# Patient Record
Sex: FEMALE
[De-identification: names, ages, dates, MRNs, and addresses within clinical notes are randomized per-mention and may not be internally consistent; named-entity substitution may affect disease eponyms.]

---

## 2018-10-02 ENCOUNTER — RX RENEWAL (OUTPATIENT)
Age: 68
End: 2018-10-02

## 2018-10-03 ENCOUNTER — RX RENEWAL (OUTPATIENT)
Age: 68
End: 2018-10-03

## 2018-12-07 ENCOUNTER — RECORD ABSTRACTING (OUTPATIENT)
Age: 68
End: 2018-12-07

## 2018-12-07 DIAGNOSIS — Z78.9 OTHER SPECIFIED HEALTH STATUS: ICD-10-CM

## 2018-12-07 DIAGNOSIS — Z86.39 PERSONAL HISTORY OF OTHER ENDOCRINE, NUTRITIONAL AND METABOLIC DISEASE: ICD-10-CM

## 2018-12-07 DIAGNOSIS — I10 ESSENTIAL (PRIMARY) HYPERTENSION: ICD-10-CM

## 2018-12-07 DIAGNOSIS — Z82.49 FAMILY HISTORY OF ISCHEMIC HEART DISEASE AND OTHER DISEASES OF THE CIRCULATORY SYSTEM: ICD-10-CM

## 2018-12-07 DIAGNOSIS — Z83.3 FAMILY HISTORY OF DIABETES MELLITUS: ICD-10-CM

## 2018-12-07 DIAGNOSIS — Z87.39 PERSONAL HISTORY OF OTHER DISEASES OF THE MUSCULOSKELETAL SYSTEM AND CONNECTIVE TISSUE: ICD-10-CM

## 2018-12-11 ENCOUNTER — APPOINTMENT (OUTPATIENT)
Dept: ENDOCRINOLOGY | Facility: CLINIC | Age: 68
End: 2018-12-11
Payer: COMMERCIAL

## 2018-12-11 VITALS
WEIGHT: 262 LBS | BODY MASS INDEX: 48.21 KG/M2 | SYSTOLIC BLOOD PRESSURE: 148 MMHG | HEIGHT: 62 IN | HEART RATE: 70 BPM | DIASTOLIC BLOOD PRESSURE: 80 MMHG

## 2018-12-11 DIAGNOSIS — Z87.19 PERSONAL HISTORY OF OTHER DISEASES OF THE DIGESTIVE SYSTEM: ICD-10-CM

## 2018-12-11 DIAGNOSIS — Z87.09 PERSONAL HISTORY OF OTHER DISEASES OF THE RESPIRATORY SYSTEM: ICD-10-CM

## 2018-12-11 LAB — GLUCOSE BLDC GLUCOMTR-MCNC: 60

## 2018-12-11 PROCEDURE — 99214 OFFICE O/P EST MOD 30 MIN: CPT | Mod: 25

## 2018-12-11 PROCEDURE — 82962 GLUCOSE BLOOD TEST: CPT

## 2019-03-26 ENCOUNTER — MEDICATION RENEWAL (OUTPATIENT)
Age: 69
End: 2019-03-26

## 2019-03-27 ENCOUNTER — RX RENEWAL (OUTPATIENT)
Age: 69
End: 2019-03-27

## 2019-03-27 ENCOUNTER — MOBILE ON CALL (OUTPATIENT)
Age: 69
End: 2019-03-27

## 2019-03-29 ENCOUNTER — MEDICATION RENEWAL (OUTPATIENT)
Age: 69
End: 2019-03-29

## 2019-04-01 ENCOUNTER — RX RENEWAL (OUTPATIENT)
Age: 69
End: 2019-04-01

## 2019-04-19 ENCOUNTER — APPOINTMENT (OUTPATIENT)
Dept: ENDOCRINOLOGY | Facility: CLINIC | Age: 69
End: 2019-04-19
Payer: COMMERCIAL

## 2019-04-19 VITALS
BODY MASS INDEX: 47.84 KG/M2 | HEIGHT: 62 IN | WEIGHT: 260 LBS | HEART RATE: 70 BPM | DIASTOLIC BLOOD PRESSURE: 70 MMHG | SYSTOLIC BLOOD PRESSURE: 134 MMHG

## 2019-04-19 LAB
GLUCOSE BLDC GLUCOMTR-MCNC: 147
GLUCOSE SERPL-MCNC: 226
HBA1C MFR BLD HPLC: 7.3
LDLC SERPL DIRECT ASSAY-MCNC: 82

## 2019-04-19 PROCEDURE — 99214 OFFICE O/P EST MOD 30 MIN: CPT | Mod: 25

## 2019-04-19 PROCEDURE — 82962 GLUCOSE BLOOD TEST: CPT

## 2019-04-19 RX ORDER — INSULIN ASPART 100 [IU]/ML
100 INJECTION, SOLUTION INTRAVENOUS; SUBCUTANEOUS
Refills: 0 | Status: DISCONTINUED | COMMUNITY
End: 2019-04-19

## 2019-04-19 NOTE — DATA REVIEWED
[FreeTextEntry1] : Labs 12/10/2018:\par Glucose 88\par LDL  82\par A1c 7.2%\par Urine microalbumin/ Creatinine ratio  17

## 2019-04-19 NOTE — REVIEW OF SYSTEMS
[Cough] : cough [Recent Weight Gain (___ Lbs)] : no recent weight gain [Recent Weight Loss (___ Lbs)] : no recent weight loss [Chest Pain] : no chest pain [Shortness Of Breath] : no shortness of breath [Nausea] : no nausea [Polyuria] : no polyuria [Polydipsia] : no polydipsia

## 2019-04-19 NOTE — HISTORY OF PRESENT ILLNESS
[FreeTextEntry1] : Patient is seen today for a routine diabetic follow up.\par Quality:  pancreatic/ type 2\par Severity:  moderate\par Duration of diabetes:  since 2009\par Onset:  occurred after severe pancreatitis in 2009\par Associated Complications/ Symptoms:  hypoglycemia\par Modifying Factors:  Better with insulin\par \par Patient tests blood glucose 2 times per day.    Reports that AM fasting BG in the 140- 170 mg/dl range.  \par \par Current Diabetic Medication Regimen:\par Tresiba 75 units qAM\par Novolog 40/40/30 units\par \par Wore Thelma Pro in 5/2018 showing a high rate of hypoglycemia so basal insulin was reduced.  Not interested in home use CGM at this time.  \par \par For past 3 months has been dealing with a lot of right knee pain due to ACL tear and mobility is limited and sleep is disrupted due to pain.  \par

## 2019-04-19 NOTE — ASSESSMENT
[FreeTextEntry1] : 69 year old female with DM (type 2 versus element of pancreatic DM), HTN and hyperlipidemia.  her glycemic control is slightly above goal\par \par 1.  Type 2 DM-   increase Tresiba to 80 units daily.  \par 2.  Hyperlipidemia-  continue atorvastatin\par 3.  HTN-  continue ACE-I. \par 4.  Vitamin D insufficiency-  increase vitamin D dose to 4000 IU daily.

## 2019-04-19 NOTE — PHYSICAL EXAM
[No Acute Distress] : no acute distress [Normal Sclera/Conjunctiva] : normal sclera/conjunctiva [No Neck Mass] : no neck mass was observed [No Proptosis] : no proptosis [No LAD] : no lymphadenopathy [Thyroid Not Enlarged] : the thyroid was not enlarged [No Thyroid Nodules] : there were no palpable thyroid nodules [No Respiratory Distress] : no respiratory distress [Clear to Auscultation] : lungs were clear to auscultation bilaterally [Normal Rate] : heart rate was normal  [Normal S1, S2] : normal S1 and S2 [Regular Rhythm] : with a regular rhythm [Acanthosis Nigricans] : acanthosis nigricans [Murmurs] : no murmurs [Normal Insight/Judgement] : insight and judgment were intact [Normal Affect] : the affect was normal [Normal Mood] : the mood was normal [de-identified] : Obese female [de-identified] : Trace b/l LE edema (R>L)

## 2019-06-14 ENCOUNTER — MEDICATION RENEWAL (OUTPATIENT)
Age: 69
End: 2019-06-14

## 2019-08-15 ENCOUNTER — MEDICATION RENEWAL (OUTPATIENT)
Age: 69
End: 2019-08-15

## 2019-08-30 ENCOUNTER — APPOINTMENT (OUTPATIENT)
Dept: ENDOCRINOLOGY | Facility: CLINIC | Age: 69
End: 2019-08-30
Payer: COMMERCIAL

## 2019-08-30 VITALS
WEIGHT: 269 LBS | SYSTOLIC BLOOD PRESSURE: 130 MMHG | HEART RATE: 75 BPM | BODY MASS INDEX: 49.5 KG/M2 | HEIGHT: 62 IN | DIASTOLIC BLOOD PRESSURE: 80 MMHG

## 2019-08-30 LAB
GLUCOSE BLDC GLUCOMTR-MCNC: 152
HBA1C MFR BLD HPLC: 7.1
LDLC SERPL DIRECT ASSAY-MCNC: 90

## 2019-08-30 PROCEDURE — 82962 GLUCOSE BLOOD TEST: CPT

## 2019-08-30 PROCEDURE — 99214 OFFICE O/P EST MOD 30 MIN: CPT | Mod: 25

## 2019-08-30 RX ORDER — CELECOXIB 50 MG/1
CAPSULE ORAL
Refills: 0 | Status: DISCONTINUED | COMMUNITY
End: 2019-08-30

## 2019-08-30 NOTE — HISTORY OF PRESENT ILLNESS
[FreeTextEntry1] : Patient is seen today for a routine diabetic follow up.\par Quality:  pancreatic/ type 2 DM\par Severity:  moderate\par Duration of diabetes:  since 2009\par Onset:  occurred after severe pancreatitis in 2009\par Associated Complications/ Symptoms:  hypoglycemia\par Modifying Factors:  Better with insulin\par \par Patient tests blood glucose 2 times per day.     Reports that most BG under 150 mg/dl at home.   Hypoglycemia is rare.  \par \par Current Diabetic Medication Regimen:\par Tresiba 80 units qAM\par Novolog 40/35/35 units\par \par Wore Thelma Pro in 5/2018 showing a high rate of hypoglycemia so basal insulin was reduced.  Not interested in home use CGM at this time.  \par \par Having a right total knee replacement in September.  C/o diffuse joint pain.  \par  \par

## 2019-08-30 NOTE — PHYSICAL EXAM
[No Acute Distress] : no acute distress [Normal Sclera/Conjunctiva] : normal sclera/conjunctiva [No Proptosis] : no proptosis [No Neck Mass] : no neck mass was observed [No LAD] : no lymphadenopathy [No Thyroid Nodules] : there were no palpable thyroid nodules [Thyroid Not Enlarged] : the thyroid was not enlarged [No Respiratory Distress] : no respiratory distress [Clear to Auscultation] : lungs were clear to auscultation bilaterally [Normal S1, S2] : normal S1 and S2 [Normal Rate] : heart rate was normal  [Regular Rhythm] : with a regular rhythm [Murmurs] : no murmurs [Acanthosis Nigricans] : acanthosis nigricans [Normal Insight/Judgement] : insight and judgment were intact [Normal Affect] : the affect was normal [Normal Mood] : the mood was normal [de-identified] : Obese female

## 2019-08-30 NOTE — REVIEW OF SYSTEMS
[Recent Weight Gain (___ Lbs)] : recent [unfilled] ~Ulb weight gain [Joint Pain] : joint pain [Chest Pain] : no chest pain [Shortness Of Breath] : no shortness of breath [Nausea] : no nausea [Abdominal Pain] : no abdominal pain [Pain/Numbness of Digits] : no pain/numbness of digits

## 2019-08-30 NOTE — DATA REVIEWED
[FreeTextEntry1] : Labs \par 8/13/2019\par LDL  90\par 25(OH)D  29\par A1c  7.1%\par TSH  2\par \par 12/10/2018:\par Glucose 88\par LDL  82\par A1c 7.2%\par Urine microalbumin/ Creatinine ratio  17

## 2019-08-30 NOTE — ASSESSMENT
[FreeTextEntry1] : 69 year old female with DM (type 2 versus element of pancreatic DM), HTN and hyperlipidemia.  her glycemic control is improving.    She can safely proceed with knee replacement from diabetic perspective.  \par \par 1.  Type 2 DM-   Continue current insulin doses.  Work on diet and exercise.   On the morning of surgery, I have instructed patient to reduce Tresiba insulin dose to 50 units to reduce risk of hypoglycemia.    \par 2.  Hyperlipidemia-  continue atorvastatin\par 3.  HTN-  continue ACE-I. \par 4.  Vitamin D insufficiency-   Continue vitamin D supplement (has not been taking lately).

## 2019-08-30 NOTE — CONSULT LETTER
[Dear  ___] : Dear  [unfilled], [Courtesy Letter:] : I had the pleasure of seeing your patient, [unfilled], in my office today. [Please see my note below.] : Please see my note below. [Consult Closing:] : Thank you very much for allowing me to participate in the care of this patient.  If you have any questions, please do not hesitate to contact me. [Sincerely,] : Sincerely, [DrJigar  ___] : Dr. BENÍTEZ [FreeTextEntry3] : Myron Hussein MD, FACE\par

## 2020-02-07 ENCOUNTER — RX RENEWAL (OUTPATIENT)
Age: 70
End: 2020-02-07

## 2020-03-17 ENCOUNTER — RX RENEWAL (OUTPATIENT)
Age: 70
End: 2020-03-17

## 2020-04-22 ENCOUNTER — RX RENEWAL (OUTPATIENT)
Age: 70
End: 2020-04-22

## 2020-06-10 LAB
HBA1C MFR BLD HPLC: 7.3
LDLC SERPL DIRECT ASSAY-MCNC: 90
MICROALBUMIN/CREAT 24H UR-RTO: 22

## 2020-06-11 ENCOUNTER — RESULT CHARGE (OUTPATIENT)
Age: 70
End: 2020-06-11

## 2020-06-11 ENCOUNTER — APPOINTMENT (OUTPATIENT)
Dept: ENDOCRINOLOGY | Facility: CLINIC | Age: 70
End: 2020-06-11
Payer: COMMERCIAL

## 2020-06-11 VITALS
BODY MASS INDEX: 49.69 KG/M2 | HEART RATE: 73 BPM | HEIGHT: 62 IN | DIASTOLIC BLOOD PRESSURE: 76 MMHG | SYSTOLIC BLOOD PRESSURE: 136 MMHG | WEIGHT: 270 LBS

## 2020-06-11 LAB — GLUCOSE BLDC GLUCOMTR-MCNC: 115

## 2020-06-11 PROCEDURE — 82962 GLUCOSE BLOOD TEST: CPT

## 2020-06-11 PROCEDURE — 99214 OFFICE O/P EST MOD 30 MIN: CPT | Mod: 25

## 2020-06-11 NOTE — ASSESSMENT
[FreeTextEntry1] : 70 year old female with DM (type 2 versus element of pancreatic DM), HTN and hyperlipidemia.   Her glycemic control is reasonable.  \par \par 1. Type 2 DM- Continue current insulin doses.  \par 2. Hyperlipidemia- continue atorvastatin\par 3. HTN- continue ACE-I. \par 4. Vitamin D insufficiency-  Insufficient on OTC dose.  Change to Vitamin D2 50,000 IU every 2 weeks.

## 2020-06-11 NOTE — PHYSICAL EXAM
[Obese] : obese [No Acute Distress] : no acute distress [Normal Sclera/Conjunctiva] : normal sclera/conjunctiva [No Neck Mass] : no neck mass was observed [No Proptosis] : no proptosis [No LAD] : no lymphadenopathy [Thyroid Not Enlarged] : the thyroid was not enlarged [Supple] : the neck was supple [No Thyroid Nodules] : no palpable thyroid nodules [Clear to Auscultation] : lungs were clear to auscultation bilaterally [No Respiratory Distress] : no respiratory distress [No Murmurs] : no murmurs [Normal S1, S2] : normal S1 and S2 [Normal Rate] : heart rate was normal [Regular Rhythm] : with a regular rhythm [Normal Insight/Judgement] : insight and judgment were intact [Normal Affect] : the affect was normal [Normal Mood] : the mood was normal [Acanthosis Nigricans] : no acanthosis nigricans

## 2020-06-11 NOTE — REVIEW OF SYSTEMS
[Shortness Of Breath] : shortness of breath [Recent Weight Gain (___ Lbs)] : no recent weight gain [Recent Weight Loss (___ Lbs)] : no recent weight loss [Nausea] : no nausea [Chest Pain] : no chest pain [Pain/Numbness of Digits] : no pain/numbness of digits

## 2020-06-11 NOTE — HISTORY OF PRESENT ILLNESS
[FreeTextEntry1] : Follow up DM, Hyperlipidemia and HTN.  \par Quality:  pancreatic/ type 2 DM\par Severity:  moderate\par Duration of diabetes:  since 2009\par Onset:  occurred after severe pancreatitis in 2009\par Associated Complications/ Symptoms:  hypoglycemia\par Modifying Factors:  Better with insulin\par \par Patient tests blood glucose 2 times per day.     AM fasting BG usually around 150 mg/dl.  Hypoglycemia is rare.  \par \par Current Diabetic Medication Regimen:\par Tresiba 80 units qAM\par Novolog 40/35/35 units\par \par Intolerant to metformin, cannot take GLP-1 due to history of pancreatitis.  \par Wore Thelma Pro in 5/201.  Not interested in home use CGM at this time.  \par  \par Will need cataract surgery this summer. \par

## 2020-10-07 ENCOUNTER — RX RENEWAL (OUTPATIENT)
Age: 70
End: 2020-10-07

## 2020-11-05 ENCOUNTER — NON-APPOINTMENT (OUTPATIENT)
Age: 70
End: 2020-11-05

## 2020-11-05 RX ORDER — PNV NO.95/FERROUS FUM/FOLIC AC 28MG-0.8MG
500-125 TABLET ORAL DAILY
Refills: 0 | Status: ACTIVE | COMMUNITY

## 2020-11-05 RX ORDER — B-COMPLEX WITH VITAMIN C
TABLET ORAL DAILY
Refills: 0 | Status: ACTIVE | COMMUNITY

## 2020-12-21 ENCOUNTER — RX RENEWAL (OUTPATIENT)
Age: 70
End: 2020-12-21

## 2020-12-29 ENCOUNTER — APPOINTMENT (OUTPATIENT)
Dept: ENDOCRINOLOGY | Facility: CLINIC | Age: 70
End: 2020-12-29

## 2021-01-06 ENCOUNTER — APPOINTMENT (OUTPATIENT)
Dept: PULMONOLOGY | Facility: CLINIC | Age: 71
End: 2021-01-06

## 2021-01-11 ENCOUNTER — APPOINTMENT (OUTPATIENT)
Dept: PULMONOLOGY | Facility: CLINIC | Age: 71
End: 2021-01-11
Payer: COMMERCIAL

## 2021-01-11 VITALS
DIASTOLIC BLOOD PRESSURE: 90 MMHG | HEART RATE: 58 BPM | SYSTOLIC BLOOD PRESSURE: 138 MMHG | TEMPERATURE: 97.3 F | OXYGEN SATURATION: 97 %

## 2021-01-11 DIAGNOSIS — Z87.39 PERSONAL HISTORY OF OTHER DISEASES OF THE MUSCULOSKELETAL SYSTEM AND CONNECTIVE TISSUE: ICD-10-CM

## 2021-01-11 PROCEDURE — 99214 OFFICE O/P EST MOD 30 MIN: CPT | Mod: 25

## 2021-01-11 PROCEDURE — 94727 GAS DIL/WSHOT DETER LNG VOL: CPT

## 2021-01-11 PROCEDURE — 94060 EVALUATION OF WHEEZING: CPT

## 2021-01-11 PROCEDURE — 94729 DIFFUSING CAPACITY: CPT

## 2021-01-11 PROCEDURE — 99072 ADDL SUPL MATRL&STAF TM PHE: CPT

## 2021-01-11 NOTE — HISTORY OF PRESENT ILLNESS
[Never] : never [TextBox_4] : the patient is 70 year F with an abnormal Ct scan c/w early ILD  She has had a stable Ct scan and a stable PFT  She ios s/p TKR- R knee and then recently cataract surgery  She is still on opthalmic steroids  She has RA and had taken methotrexate in the past

## 2021-01-11 NOTE — REVIEW OF SYSTEMS
[SOB on Exertion] : sob on exertion [Diabetes] : diabetes [Obesity] : obesity [Negative] : Psychiatric [Seasonal Allergies] : no seasonal allergies [Thyroid Problem] : no thyroid problem [TextBox_14] : sx post cataract surgery  [TextBox_94] : KENDRA membreno now has back issues

## 2021-01-11 NOTE — ASSESSMENT
[FreeTextEntry1] : The patient is doing well  but is limited by the RA and obesity  I told her at age 71 the extra weight will further exaggerate her functional status  She has stable ILD and she had a PFT which was stable  She had a stable Ct scan in 9/21 She will need a PFt in 6mos  and a Ct in one year

## 2021-01-11 NOTE — REASON FOR VISIT
[Follow-Up] : a follow-up visit [ILD] : ILD [TextBox_44] : PT IS A 71 YO FEMALE WITH HX OF INTERSTITIAL LUNG DX.  PT STATES THAT SHE STILL HAS A COUGH, NON-PRODUCTIVE, BUT DENIES ANY WHEEZING.  PT HAS OCCASIONAL SOB DEPENDING ON ACTIVITY.PT DENIES ANY FEVER, CHILLS, CHEST PAIN OR TIGHTNESS WHEN BREATHING. PT HAD PFT TODAY

## 2021-01-11 NOTE — PHYSICAL EXAM
[No Acute Distress] : no acute distress [Normal Appearance] : normal appearance [Normal Rate/Rhythm] : normal rate/rhythm [Normal S1, S2] : normal s1, s2 [No Resp Distress] : no resp distress [No Abnormalities] : no abnormalities [Benign] : benign [No Clubbing] : no clubbing [No Cyanosis] : no cyanosis [No Edema] : no edema [FROM] : FROM [No Focal Deficits] : no focal deficits [Oriented x3] : oriented x3 [Normal Affect] : normal affect [TextBox_68] : crackles bilaterally lower post bases

## 2021-01-21 ENCOUNTER — APPOINTMENT (OUTPATIENT)
Dept: ENDOCRINOLOGY | Facility: CLINIC | Age: 71
End: 2021-01-21

## 2021-02-05 ENCOUNTER — APPOINTMENT (OUTPATIENT)
Dept: ENDOCRINOLOGY | Facility: CLINIC | Age: 71
End: 2021-02-05
Payer: COMMERCIAL

## 2021-02-05 VITALS
DIASTOLIC BLOOD PRESSURE: 84 MMHG | SYSTOLIC BLOOD PRESSURE: 124 MMHG | HEIGHT: 62 IN | TEMPERATURE: 98.1 F | WEIGHT: 268.5 LBS | RESPIRATION RATE: 16 BRPM | BODY MASS INDEX: 49.41 KG/M2 | HEART RATE: 74 BPM | OXYGEN SATURATION: 96 %

## 2021-02-05 LAB — GLUCOSE BLDC GLUCOMTR-MCNC: 95

## 2021-02-05 PROCEDURE — 82962 GLUCOSE BLOOD TEST: CPT

## 2021-02-05 PROCEDURE — 99072 ADDL SUPL MATRL&STAF TM PHE: CPT

## 2021-02-05 PROCEDURE — 99214 OFFICE O/P EST MOD 30 MIN: CPT | Mod: 25

## 2021-02-05 RX ORDER — ADHESIVE TAPE 3"X 2.3 YD
50 MCG TAPE, NON-MEDICATED TOPICAL
Refills: 0 | Status: DISCONTINUED | COMMUNITY
End: 2021-02-05

## 2021-02-05 NOTE — ASSESSMENT
[FreeTextEntry1] : 71 year old female with DM (type 2 versus element of pancreatic DM), HTN and hyperlipidemia.    Her diabetes control seems reasonable, but need labs to confirm.   \par \par 1.  Type 2 DM-  check labs now.  Try addition of Jardiance 10 mg daily.  May need to reduce mealtime Novolog doses by 5 units if BG is trending lower with SGLT-2 Inhibitor.   \par 2. Hyperlipidemia- continue atorvastatin\par 3. HTN- continue ACE-I. \par 4. Vitamin D insufficiency-  Continue Vitamin D2 50,000 IU every 2 weeks.

## 2021-02-05 NOTE — DATA REVIEWED
[FreeTextEntry1] : Labs \par 10/2/2020:\par Creatinine 0.98\par LDL 98\par A1c 7.4%\par 25(OH)D  34\par \par 8/13/2019\par LDL  90\par 25(OH)D  29\par A1c  7.1%\par TSH  2\par \par 12/10/2018:\par Glucose 88\par LDL  82\par A1c 7.2%\par Urine microalbumin/ Creatinine ratio  17

## 2021-02-05 NOTE — HISTORY OF PRESENT ILLNESS
[FreeTextEntry1] : Follow up DM, Hyperlipidemia and HTN.\par   \par Quality:  pancreatic/ type 2 DM\par Severity:  moderate\par Duration of diabetes:  since 2009\par Onset:  occurred after severe pancreatitis in 2009\par Associated Complications/ Symptoms:  hypoglycemia\par Modifying Factors:  Better with insulin\par \par Patient tests blood glucose 2 times per day.    Reports that hypoglycemia is rare.   \par \par Current Diabetic Medication Regimen:\par Tresiba 85 units qAM\par Novolog 40/35/35 units\par \par Intolerant to metformin, cannot take GLP-1 due to history of pancreatitis.  \par Wore Thelma Pro in 5/201.  Not interested in home use CGM at this time.  \par  \par \par

## 2021-02-05 NOTE — PHYSICAL EXAM
[Obese] : obese [No Acute Distress] : no acute distress [Normal Sclera/Conjunctiva] : normal sclera/conjunctiva [No Lid Lag] : no lid lag [No Respiratory Distress] : no respiratory distress [Clear to Auscultation] : lungs were clear to auscultation bilaterally [Normal S1, S2] : normal S1 and S2 [No Murmurs] : no murmurs [Normal Rate] : heart rate was normal [Regular Rhythm] : with a regular rhythm [Right Foot Was Examined] : right foot ~C was examined [Left Foot Was Examined] : left foot ~C was examined [Normal] : normal [1+] : 1+ in the dorsalis pedis [Normal Affect] : the affect was normal [Normal Insight/Judgement] : insight and judgment were intact [Normal Mood] : the mood was normal [Acanthosis Nigricans] : no acanthosis nigricans [Diminished Throughout Both Feet] : normal tactile sensation with monofilament testing throughout both feet [FreeTextEntry1] : Callus [FreeTextEntry5] : Callus

## 2021-03-04 VITALS — WEIGHT: 261 LBS | BODY MASS INDEX: 46.25 KG/M2 | HEIGHT: 63 IN

## 2021-05-05 DIAGNOSIS — Z87.39 PERSONAL HISTORY OF OTHER DISEASES OF THE MUSCULOSKELETAL SYSTEM AND CONNECTIVE TISSUE: ICD-10-CM

## 2021-05-24 ENCOUNTER — RX RENEWAL (OUTPATIENT)
Age: 71
End: 2021-05-24

## 2021-06-02 ENCOUNTER — NON-APPOINTMENT (OUTPATIENT)
Age: 71
End: 2021-06-02

## 2021-06-05 ENCOUNTER — APPOINTMENT (OUTPATIENT)
Dept: FAMILY MEDICINE | Facility: CLINIC | Age: 71
End: 2021-06-05

## 2021-06-22 LAB
HBA1C MFR BLD HPLC: 7.2
LDLC SERPL DIRECT ASSAY-MCNC: 95
MICROALBUMIN/CREAT 24H UR-RTO: 67

## 2021-06-25 ENCOUNTER — APPOINTMENT (OUTPATIENT)
Dept: ENDOCRINOLOGY | Facility: CLINIC | Age: 71
End: 2021-06-25
Payer: MEDICARE

## 2021-06-25 VITALS
OXYGEN SATURATION: 97 % | BODY MASS INDEX: 47.59 KG/M2 | HEART RATE: 96 BPM | RESPIRATION RATE: 16 BRPM | SYSTOLIC BLOOD PRESSURE: 140 MMHG | WEIGHT: 268.56 LBS | HEIGHT: 63 IN | DIASTOLIC BLOOD PRESSURE: 80 MMHG | TEMPERATURE: 98.3 F

## 2021-06-25 LAB — GLUCOSE BLDC GLUCOMTR-MCNC: 263

## 2021-06-25 PROCEDURE — 82962 GLUCOSE BLOOD TEST: CPT

## 2021-06-25 PROCEDURE — 99214 OFFICE O/P EST MOD 30 MIN: CPT

## 2021-06-25 RX ORDER — EMPAGLIFLOZIN 10 MG/1
10 TABLET, FILM COATED ORAL DAILY
Qty: 30 | Refills: 5 | Status: DISCONTINUED | COMMUNITY
Start: 2021-02-05 | End: 2021-06-25

## 2021-06-25 NOTE — PHYSICAL EXAM
[Obese] : obese [No Acute Distress] : no acute distress [Normal Sclera/Conjunctiva] : normal sclera/conjunctiva [No Lid Lag] : no lid lag [No Neck Mass] : no neck mass was observed [No LAD] : no lymphadenopathy [Supple] : the neck was supple [Thyroid Not Enlarged] : the thyroid was not enlarged [No Thyroid Nodules] : no palpable thyroid nodules [No Respiratory Distress] : no respiratory distress [Clear to Auscultation] : lungs were clear to auscultation bilaterally [Normal S1, S2] : normal S1 and S2 [No Murmurs] : no murmurs [Normal Rate] : heart rate was normal [Regular Rhythm] : with a regular rhythm [Normal Affect] : the affect was normal [Normal Insight/Judgement] : insight and judgment were intact [Normal Mood] : the mood was normal [Acanthosis Nigricans] : no acanthosis nigricans

## 2021-06-25 NOTE — HISTORY OF PRESENT ILLNESS
[FreeTextEntry1] : Follow up DM, Hyperlipidemia and HTN.\par   \par Quality:  pancreatic/ type 2 DM\par Severity:  moderate\par Duration of diabetes:  since 2009\par Onset:  occurred after severe pancreatitis in 2009\par Associated Complications/ Symptoms:  microalbuminuria\par Modifying Factors:  Better with insulin\par \par Patient tests blood glucose 2 times per day.    Did not bring glucometer to visit. \par \par Current Diabetic Medication Regimen:\par Tresiba 85 units qAM\par Novolog 45/40/40 units\par Last visit given Rx for Jardiance, but stopped taking due to recurrent yeast infections.  \par \par Intolerant to metformin, cannot take GLP-1 due to history of pancreatitis.  \par Wore Thelma Pro in 5/201.  Not interested in home use CGM at this time.  \par  \par \par

## 2021-06-25 NOTE — DATA REVIEWED
[FreeTextEntry1] : Labs \par 10/2/2020:\par Creatinine 0.98\par LDL 98\par A1c 7.4%\par 25(OH)D  34\par \par \par \par

## 2021-06-25 NOTE — REVIEW OF SYSTEMS
[Back Pain] : back pain [Recent Weight Gain (___ Lbs)] : no recent weight gain [Recent Weight Loss (___ Lbs)] : no recent weight loss [Chest Pain] : no chest pain [Shortness Of Breath] : no shortness of breath

## 2021-06-25 NOTE — ASSESSMENT
[FreeTextEntry1] : 71 year old female with DM (type 2 versus element of pancreatic DM), HTN and hyperlipidemia.    Her diabetes control is reasonable.  She now has slight microalbuminuria.  \par \par 1.  Type 2 DM-  Continue current insulin regimen.  Repeat A1c in 3 months.    Discussed that she will be covered for Dexcom on Medicare, but patient is unsure if she wants to retry CGM.  \par 2. Hyperlipidemia- continue atorvastatin\par 3. HTN- continue lisinopril\par 4. Vitamin D insufficiency-  Continue Vitamin D2 50,000 IU every 2 weeks.

## 2021-07-07 ENCOUNTER — APPOINTMENT (OUTPATIENT)
Dept: FAMILY MEDICINE | Facility: CLINIC | Age: 71
End: 2021-07-07
Payer: MEDICARE

## 2021-07-07 VITALS
BODY MASS INDEX: 47.84 KG/M2 | TEMPERATURE: 96.8 F | HEART RATE: 66 BPM | DIASTOLIC BLOOD PRESSURE: 88 MMHG | OXYGEN SATURATION: 98 % | SYSTOLIC BLOOD PRESSURE: 134 MMHG | HEIGHT: 63 IN | WEIGHT: 270 LBS

## 2021-07-07 PROCEDURE — 99213 OFFICE O/P EST LOW 20 MIN: CPT

## 2021-07-07 RX ORDER — ATORVASTATIN CALCIUM 20 MG/1
20 TABLET, FILM COATED ORAL
Qty: 90 | Refills: 0 | Status: DISCONTINUED | COMMUNITY
Start: 2018-09-07 | End: 2021-07-07

## 2021-07-07 NOTE — HISTORY OF PRESENT ILLNESS
[FreeTextEntry1] : feeeling well\par \par presents for  refills\par \par will be having eye surgery july 20\par \par history of abnormal ekg\par will need  cardiac clearance

## 2021-07-07 NOTE — REVIEW OF SYSTEMS
[Chest Pain] : no chest pain [Shortness Of Breath] : no shortness of breath [FreeTextEntry2] : non contributory  except as stated in  cc

## 2021-07-07 NOTE — PLAN
[FreeTextEntry1] : will need  cardiac  clearance\par \par follows with rhem.  cardiology and pulmonary\par \par \par rto  for  pre op clearance  3-5 days prior to surgery\par \par will be having pre op testing at hospital\par \par \par \par

## 2021-07-19 ENCOUNTER — APPOINTMENT (OUTPATIENT)
Dept: FAMILY MEDICINE | Facility: CLINIC | Age: 71
End: 2021-07-19
Payer: MEDICARE

## 2021-07-19 VITALS
DIASTOLIC BLOOD PRESSURE: 84 MMHG | TEMPERATURE: 97.1 F | RESPIRATION RATE: 12 BRPM | WEIGHT: 270 LBS | OXYGEN SATURATION: 95 % | SYSTOLIC BLOOD PRESSURE: 136 MMHG | HEIGHT: 63 IN | HEART RATE: 68 BPM | BODY MASS INDEX: 47.84 KG/M2

## 2021-07-19 DIAGNOSIS — T75.3XXA MOTION SICKNESS, INITIAL ENCOUNTER: ICD-10-CM

## 2021-07-19 PROCEDURE — 99214 OFFICE O/P EST MOD 30 MIN: CPT

## 2021-07-19 RX ORDER — SCOPOLAMINE 1.5 MG/1
1 PATCH, EXTENDED RELEASE TRANSDERMAL
Qty: 1 | Refills: 1 | Status: ACTIVE | COMMUNITY
Start: 2021-07-19 | End: 1900-01-01

## 2021-07-19 NOTE — PLAN
[FreeTextEntry1] : WILL CLEAR AFTER  REVIEW OF LABS  AND CHEST X RAY  \par \par HAS  CARDIAC CLEARANCE\par \par

## 2021-07-19 NOTE — HISTORY OF PRESENT ILLNESS
[FreeTextEntry1] : FEELING WELL\par \par PRESENTS FOR  PRE OP CLEARANCE\par \par HAS  CARDIAC CLEARANCE

## 2021-07-20 ENCOUNTER — APPOINTMENT (OUTPATIENT)
Dept: PULMONOLOGY | Facility: CLINIC | Age: 71
End: 2021-07-20

## 2021-09-24 ENCOUNTER — APPOINTMENT (OUTPATIENT)
Dept: PULMONOLOGY | Facility: CLINIC | Age: 71
End: 2021-09-24
Payer: MEDICARE

## 2021-09-24 VITALS
HEIGHT: 63 IN | HEART RATE: 77 BPM | TEMPERATURE: 98.4 F | DIASTOLIC BLOOD PRESSURE: 85 MMHG | OXYGEN SATURATION: 94 % | BODY MASS INDEX: 46.07 KG/M2 | SYSTOLIC BLOOD PRESSURE: 173 MMHG | WEIGHT: 260 LBS

## 2021-09-24 PROCEDURE — 99214 OFFICE O/P EST MOD 30 MIN: CPT

## 2021-09-24 RX ORDER — PREDNISOLONE ACETATE 10 MG/ML
1 SUSPENSION/ DROPS OPHTHALMIC
Qty: 15 | Refills: 0 | Status: ACTIVE | COMMUNITY
Start: 2021-09-21

## 2021-09-24 NOTE — REASON FOR VISIT
Detail Level: Zone [Follow-Up] : a follow-up visit [TextBox_44] : Pt currently denies any cough or wheeze, but admits to a little SOB if she moves around too much.  Pt had PFT today and CT scan in June - copt scanned to chart

## 2021-09-24 NOTE — HISTORY OF PRESENT ILLNESS
[Never] : never [TextBox_4] : the patient is 70 year F with an abnormal Ct scan c/w early ILD  She has had a stable Ct scan and a stable PFT  She ios s/p TKR- R knee and then recently cataract surgery  She is still on opthalmic steroids  She has RA and had taken methotrexate in the past \par \par 9/24/21 The patient is a 70 yo with Rheum A and she  has mild ILD She is off methotrexate and is on orencia and NSAIDS   She had a PFT today

## 2021-09-24 NOTE — PHYSICAL EXAM
[No Acute Distress] : no acute distress [Normal Appearance] : normal appearance [Normal Rate/Rhythm] : normal rate/rhythm [Normal S1, S2] : normal s1, s2 [No Resp Distress] : no resp distress [No Abnormalities] : no abnormalities [Benign] : benign [No Clubbing] : no clubbing [No Cyanosis] : no cyanosis [No Edema] : no edema [FROM] : FROM [No Focal Deficits] : no focal deficits [Oriented x3] : oriented x3 [Normal Affect] : normal affect [TextBox_2] : morbidly obese  [TextBox_68] : crackles bilaterally lower post bases

## 2021-09-24 NOTE — ASSESSMENT
[FreeTextEntry1] : The patient is doing well  but is limited by the RA and obesity  I told her at age 71 the extra weight will further exaggerate her functional status  She has stable ILD and she had a PFT which was stable  She had a stable Ct scan in 9/21 She will need a PFt in 6mos  and a Ct in one year \par \par the patient is clinically stable and her PFTs are stable The DLCO is slightly better and the FVC is slightly lower all within the range of test error    She is to hava f/u Ct scan in ~ 6months   which would be her yearly scan

## 2021-09-29 ENCOUNTER — APPOINTMENT (OUTPATIENT)
Dept: FAMILY MEDICINE | Facility: CLINIC | Age: 71
End: 2021-09-29
Payer: MEDICARE

## 2021-09-29 VITALS
RESPIRATION RATE: 14 BRPM | HEART RATE: 78 BPM | BODY MASS INDEX: 47.69 KG/M2 | SYSTOLIC BLOOD PRESSURE: 130 MMHG | WEIGHT: 269.13 LBS | DIASTOLIC BLOOD PRESSURE: 86 MMHG | OXYGEN SATURATION: 95 % | HEIGHT: 63 IN | TEMPERATURE: 97.3 F

## 2021-09-29 PROCEDURE — 99213 OFFICE O/P EST LOW 20 MIN: CPT

## 2021-09-29 NOTE — HISTORY OF PRESENT ILLNESS
[FreeTextEntry1] : feeling well\par \par presents for  refills\par \par follows with endo.\par \par labs done by endo.

## 2021-09-29 NOTE — PLAN
[FreeTextEntry1] : continue  same  meds\par \par follow up with endo\par \par \par healthy  diet  exercise

## 2021-09-30 RX ORDER — BLOOD-GLUCOSE METER
70 EACH MISCELLANEOUS
Qty: 500 | Refills: 1 | Status: ACTIVE | COMMUNITY
Start: 2021-09-30 | End: 1900-01-01

## 2021-10-12 ENCOUNTER — RX RENEWAL (OUTPATIENT)
Age: 71
End: 2021-10-12

## 2021-10-29 ENCOUNTER — APPOINTMENT (OUTPATIENT)
Dept: ENDOCRINOLOGY | Facility: CLINIC | Age: 71
End: 2021-10-29
Payer: MEDICARE

## 2021-10-29 VITALS
BODY MASS INDEX: 47.71 KG/M2 | WEIGHT: 269.25 LBS | TEMPERATURE: 97.7 F | RESPIRATION RATE: 16 BRPM | HEART RATE: 74 BPM | SYSTOLIC BLOOD PRESSURE: 130 MMHG | OXYGEN SATURATION: 96 % | HEIGHT: 63 IN | DIASTOLIC BLOOD PRESSURE: 80 MMHG

## 2021-10-29 LAB — GLUCOSE BLDC GLUCOMTR-MCNC: 91

## 2021-10-29 PROCEDURE — 99214 OFFICE O/P EST MOD 30 MIN: CPT | Mod: 25

## 2021-10-29 PROCEDURE — 82962 GLUCOSE BLOOD TEST: CPT

## 2021-10-29 NOTE — DATA REVIEWED
[FreeTextEntry1] : Labs:\par 10/12/2021:\par LDL 74\par TSH  1.79\par A1c 7.5\par UACR  27\par \par 10/2/2020:\par Creatinine 0.98\par LDL 98\par A1c 7.4%\par 25(OH)D  34\par \par \par \par

## 2021-10-29 NOTE — HISTORY OF PRESENT ILLNESS
[FreeTextEntry1] : Follow up DM, Hyperlipidemia and HTN.\par   \par Quality:  pancreatic/ type 2 DM\par Severity:  moderate\par Duration of diabetes:  since 2009\par Onset:  occurred after severe pancreatitis in 2009\par Associated Complications/ Symptoms:  microalbuminuria\par Modifying Factors:  Better with insulin\par \par Patient tests blood glucose 2 times per day.     Does not want to use CGM.    Reviewed glucometer and 30 day average BG is 165 mg/dl.  Having some fasting hyperglycemia.  Hypoglycemia is rare.  \par \par Current Diabetic Medication Regimen:\par Tresiba 85 units qAM\par Novolog 45/40/40 units\par Intolerant to Jardiance due to yeast infection.   \par Intolerant to metformin, cannot take GLP-1 due to history of pancreatitis.  \par Wore Thelma Pro in 5/201.   \par  \par \par

## 2021-10-29 NOTE — ASSESSMENT
[FreeTextEntry1] : 71 year old female with DM (type 2 versus element of pancreatic DM), HTN and hyperlipidemia.    Her diabetes control is reasonable.   \par \par 1.  Type 2 DM-   Will increase Tresiba to 90 units daily to reduce fasting hyperglycemia.  \par 2. Hyperlipidemia- continue atorvastatin\par 3. HTN- continue lisinopril\par 4. Vitamin D insufficiency-  Continue Vitamin D2 50,000 IU every 2 weeks.

## 2021-12-02 ENCOUNTER — RX RENEWAL (OUTPATIENT)
Age: 71
End: 2021-12-02

## 2021-12-28 ENCOUNTER — APPOINTMENT (OUTPATIENT)
Dept: FAMILY MEDICINE | Facility: CLINIC | Age: 71
End: 2021-12-28
Payer: MEDICARE

## 2021-12-28 VITALS
WEIGHT: 260 LBS | BODY MASS INDEX: 46.07 KG/M2 | OXYGEN SATURATION: 96 % | TEMPERATURE: 96.5 F | SYSTOLIC BLOOD PRESSURE: 130 MMHG | RESPIRATION RATE: 16 BRPM | HEIGHT: 63 IN | HEART RATE: 84 BPM | DIASTOLIC BLOOD PRESSURE: 80 MMHG

## 2021-12-28 PROCEDURE — 99214 OFFICE O/P EST MOD 30 MIN: CPT | Mod: CS

## 2021-12-28 NOTE — PLAN
[FreeTextEntry1] : Follows with Endo\par Follows with Rheumatology and pain management\par recent labs reviewed with patient\par Covid swab done

## 2021-12-28 NOTE — HISTORY OF PRESENT ILLNESS
[FreeTextEntry1] : Here for refills Doing well, offering no complaints.  Concerned that she was exposed to a person with covid.  Denies any symptomology\par Follows with Dr Keenan schneider.  Labs reviewed with patient

## 2021-12-28 NOTE — ASSESSMENT
[FreeTextEntry1] : HTN\par DMll\par Rheumatoid Arthritis\par Hyperlipidemia\par Recent exposure to a covid person

## 2022-01-11 LAB — SARS-COV-2 N GENE NPH QL NAA+PROBE: NOT DETECTED

## 2022-02-25 ENCOUNTER — NON-APPOINTMENT (OUTPATIENT)
Age: 72
End: 2022-02-25

## 2022-02-25 ENCOUNTER — APPOINTMENT (OUTPATIENT)
Dept: ENDOCRINOLOGY | Facility: CLINIC | Age: 72
End: 2022-02-25
Payer: MEDICARE

## 2022-02-25 VITALS
OXYGEN SATURATION: 98 % | HEART RATE: 68 BPM | RESPIRATION RATE: 16 BRPM | DIASTOLIC BLOOD PRESSURE: 80 MMHG | BODY MASS INDEX: 46.81 KG/M2 | WEIGHT: 264.19 LBS | TEMPERATURE: 97.3 F | HEIGHT: 63 IN | SYSTOLIC BLOOD PRESSURE: 140 MMHG

## 2022-02-25 LAB — GLUCOSE BLDC GLUCOMTR-MCNC: 112

## 2022-02-25 PROCEDURE — 82962 GLUCOSE BLOOD TEST: CPT

## 2022-02-25 PROCEDURE — 99214 OFFICE O/P EST MOD 30 MIN: CPT | Mod: 25

## 2022-02-25 RX ORDER — BLOOD SUGAR DIAGNOSTIC
STRIP MISCELLANEOUS
Qty: 300 | Refills: 3 | Status: ACTIVE | COMMUNITY
Start: 2021-10-29 | End: 1900-01-01

## 2022-02-25 RX ORDER — LANCETS 33 GAUGE
EACH MISCELLANEOUS
Qty: 300 | Refills: 3 | Status: ACTIVE | COMMUNITY
Start: 2021-10-29 | End: 1900-01-01

## 2022-02-25 RX ORDER — BLOOD-GLUCOSE METER
W/DEVICE EACH MISCELLANEOUS
Qty: 1 | Refills: 0 | Status: DISCONTINUED | COMMUNITY
Start: 2018-12-11 | End: 2022-02-25

## 2022-02-25 NOTE — ASSESSMENT
[FreeTextEntry1] : 72 year old female with DM (type 2 versus element of pancreatic DM), HTN and hyperlipidemia.    Her diabetes control has worsened.  \par \par 1.  Type 2 DM-   History of many drug intolerances.  Recommend trial of insulin sensitizer such as pioglitazone.   Will start pioglitazone 15 mg daily.  Highly recommended trial of CGM such as dexcom, but patient remains hesitant.   Advised patient to increase SMBG and improve diet.    \par 2. Hyperlipidemia- continue atorvastatin\par 3. HTN- continue lisinopril\par 4. Vitamin D insufficiency-  Continue Vitamin D2 50,000 IU every 2 weeks.

## 2022-02-25 NOTE — HISTORY OF PRESENT ILLNESS
[FreeTextEntry1] : Follow up DM, Hyperlipidemia and HTN.\par   \par Quality:  pancreatic/ type 2 DM\par Severity:  moderate\par Duration of diabetes:  since 2009\par Onset:  occurred after severe pancreatitis in 2009\par Associated Complications/ Symptoms:  microalbuminuria\par Modifying Factors:  Better with insulin\par \par SMBG: tests blood glucose 2-3 times per day.     Does not want to use CGM.    Was lax with testing, but has increased over the past week.   7 day average is now 158 mg/dl, 30 day average is 191 mg/dl.  \par \par Current Diabetic Medication Regimen:\par Tresiba 85 units qAM\par Novolog 45/45/35 units\par Intolerant to Jardiance due to yeast infection.   \par Intolerant to metformin, cannot take GLP-1 due to history of pancreatitis.  \par Wore Thelma Pro in 5/201.   \par  \par Had some epidural injections recently.  \par Had procedure done for back pain last week (nerve ablation) and reports improved pain.  \par Admits to some dietary indiscretion.  \par

## 2022-02-25 NOTE — REVIEW OF SYSTEMS
This medication has been approved. He should notify his cardiologist that he will be taking Levaquin while on Amiodarone as the combination can prolong the QT interval.  He may need an EKG while on this but will defer to cardiology.   [Back Pain] : back pain [Chest Pain] : no chest pain [Shortness Of Breath] : no shortness of breath [Nausea] : no nausea

## 2022-02-25 NOTE — DATA REVIEWED
[FreeTextEntry1] : Labs:\par 2/14/2022:\par Glucose 119\par \par 25(OH)D 30\par UACR 43\par A1c 8.5%\par \par 10/12/2021:\par LDL 74\par TSH  1.79\par A1c 7.5\par UACR  27\par \par 10/2/2020:\par Creatinine 0.98\par LDL 98\par A1c 7.4%\par 25(OH)D  34\par \par \par \par

## 2022-03-22 ENCOUNTER — APPOINTMENT (OUTPATIENT)
Dept: FAMILY MEDICINE | Facility: CLINIC | Age: 72
End: 2022-03-22
Payer: MEDICARE

## 2022-03-22 VITALS
OXYGEN SATURATION: 98 % | SYSTOLIC BLOOD PRESSURE: 126 MMHG | TEMPERATURE: 97.2 F | DIASTOLIC BLOOD PRESSURE: 78 MMHG | WEIGHT: 267 LBS | HEART RATE: 76 BPM | HEIGHT: 63 IN | BODY MASS INDEX: 47.31 KG/M2

## 2022-03-22 PROCEDURE — 99214 OFFICE O/P EST MOD 30 MIN: CPT

## 2022-03-22 RX ORDER — NYSTATIN 100MM UNIT
POWDER (EA) MISCELLANEOUS
Qty: 1 | Refills: 3 | Status: ACTIVE | COMMUNITY
Start: 2022-03-22 | End: 1900-01-01

## 2022-03-22 NOTE — HISTORY OF PRESENT ILLNESS
[FreeTextEntry1] : Presents today for refills only.  Had follow-up with Dr Hussein (endo) beginning of the month.   [id number].  He added Pioglitazone  15 mg daily.  Labs reviewed with patient.

## 2022-06-09 ENCOUNTER — NON-APPOINTMENT (OUTPATIENT)
Age: 72
End: 2022-06-09

## 2022-06-24 ENCOUNTER — APPOINTMENT (OUTPATIENT)
Dept: ENDOCRINOLOGY | Facility: CLINIC | Age: 72
End: 2022-06-24

## 2022-07-13 ENCOUNTER — APPOINTMENT (OUTPATIENT)
Dept: PULMONOLOGY | Facility: CLINIC | Age: 72
End: 2022-07-13

## 2022-07-13 VITALS
TEMPERATURE: 97.8 F | SYSTOLIC BLOOD PRESSURE: 145 MMHG | OXYGEN SATURATION: 97 % | WEIGHT: 267 LBS | HEIGHT: 63 IN | BODY MASS INDEX: 47.31 KG/M2 | HEART RATE: 62 BPM | DIASTOLIC BLOOD PRESSURE: 82 MMHG

## 2022-07-13 PROCEDURE — 99214 OFFICE O/P EST MOD 30 MIN: CPT

## 2022-07-13 RX ORDER — COVID-19 ANTIGEN TEST
KIT MISCELLANEOUS
Qty: 4 | Refills: 0 | Status: ACTIVE | COMMUNITY
Start: 2022-03-16

## 2022-07-13 RX ORDER — ABATACEPT 125 MG/ML
125 INJECTION, SOLUTION SUBCUTANEOUS
Refills: 0 | Status: ACTIVE | COMMUNITY

## 2022-07-13 RX ORDER — ATORVASTATIN CALCIUM 40 MG/1
40 TABLET, FILM COATED ORAL DAILY
Qty: 90 | Refills: 0 | Status: DISCONTINUED | COMMUNITY
End: 2022-07-13

## 2022-07-13 RX ORDER — PSYLLIUM HUSK 0.4 G
CAPSULE ORAL
Refills: 0 | Status: ACTIVE | COMMUNITY

## 2022-07-13 RX ORDER — NYSTATIN 100000 [USP'U]/G
100000 POWDER TOPICAL
Qty: 60 | Refills: 0 | Status: ACTIVE | COMMUNITY
Start: 2022-03-22

## 2022-07-13 RX ORDER — SULFASALAZINE 500 MG/1
500 TABLET, DELAYED RELEASE ORAL
Qty: 360 | Refills: 0 | Status: ACTIVE | COMMUNITY
Start: 2022-05-04

## 2022-07-13 NOTE — HISTORY OF PRESENT ILLNESS
[Never] : never [TextBox_4] : the patient is 70 year F with an abnormal Ct scan c/w early ILD  She has had a stable Ct scan and a stable PFT  She ios s/p TKR- R knee and then recently cataract surgery  She is still on opthalmic steroids  She has RA and had taken methotrexate in the past \par \par 9/24/21 The patient is a 72 yo with Rheum A and she  has mild ILD She is off methotrexate and is on orencia and NSAIDS   She had a PFT today \par \par 7/13/22 The patient has no new complaints  She has no cough, wheeze, or new dyspnea

## 2022-07-13 NOTE — REASON FOR VISIT
[Follow-Up] : a follow-up visit [Cough] : cough [Shortness of Breath] : shortness of breath [TextBox_44] : 10 months. CT done. Pt complains of SOB with minimal activity and a dry cough.

## 2022-07-13 NOTE — PHYSICAL EXAM
[No Acute Distress] : no acute distress [Normal Appearance] : normal appearance [Normal Rate/Rhythm] : normal rate/rhythm [Normal S1, S2] : normal s1, s2 [No Resp Distress] : no resp distress [No Abnormalities] : no abnormalities [Benign] : benign [No Clubbing] : no clubbing [No Cyanosis] : no cyanosis [No Edema] : no edema [FROM] : FROM [No Focal Deficits] : no focal deficits [Oriented x3] : oriented x3 [Normal Affect] : normal affect [TextBox_2] : morbidly obese  BMI 47  [TextBox_68] : crackles bilaterally lower post bases

## 2022-07-13 NOTE — ASSESSMENT
[FreeTextEntry1] : The patient is doing well  but is limited by the RA and obesity  I told her at age 71 the extra weight will further exaggerate her functional status  She has stable ILD and she had a PFT which was stable  She had a stable Ct scan in 9/21 She will need a PFt in 6mos  and a Ct in one year \par \par the patient is clinically stable and her PFTs are stable The DLCO is slightly better and the FVC is slightly lower all within the range of test error    She is to hava f/u Ct scan in ~ 6months   which would be her yearly scan   \par \par 7/13/22  The patient is doing well except she has not been able to lose weight but it has not gone up  She had a Ct scan of the chest and it is stable, and reviewed with the patient   A PFT has not been done this year  She will have a PFT and visit in 6 months and  then again in one year a visit and  a PFT    time spent 30minutes  counselling, education, review of imaging and PE/HX

## 2022-07-19 ENCOUNTER — APPOINTMENT (OUTPATIENT)
Dept: FAMILY MEDICINE | Facility: CLINIC | Age: 72
End: 2022-07-19

## 2022-07-19 VITALS
OXYGEN SATURATION: 96 % | HEART RATE: 86 BPM | DIASTOLIC BLOOD PRESSURE: 70 MMHG | RESPIRATION RATE: 16 BRPM | HEIGHT: 63 IN | SYSTOLIC BLOOD PRESSURE: 120 MMHG | WEIGHT: 271 LBS | BODY MASS INDEX: 48.02 KG/M2 | TEMPERATURE: 97.2 F

## 2022-07-19 DIAGNOSIS — Z01.818 ENCOUNTER FOR OTHER PREPROCEDURAL EXAMINATION: ICD-10-CM

## 2022-07-19 PROCEDURE — 99214 OFFICE O/P EST MOD 30 MIN: CPT

## 2022-07-19 NOTE — HISTORY OF PRESENT ILLNESS
[FreeTextEntry1] : Presents for pre-op clearance for dental extractions.  States needs only a note.  Was medically cleared by cardiology DR Velásquez.  States in his note Condition is stable for extractions.

## 2022-07-19 NOTE — ASSESSMENT
[FreeTextEntry1] : Pre-op clearance\par HTN\par Hyperlipid\par Dental root impacted\par Obesity\par tinea

## 2022-07-19 NOTE — PHYSICAL EXAM
[No Acute Distress] : no acute distress [Well Nourished] : well nourished [Well Developed] : well developed [Well-Appearing] : well-appearing [Normal Sclera/Conjunctiva] : normal sclera/conjunctiva [PERRL] : pupils equal round and reactive to light [EOMI] : extraocular movements intact [Normal Outer Ear/Nose] : the outer ears and nose were normal in appearance [Normal Oropharynx] : the oropharynx was normal [No JVD] : no jugular venous distention [No Lymphadenopathy] : no lymphadenopathy [Supple] : supple [Thyroid Normal, No Nodules] : the thyroid was normal and there were no nodules present [No Respiratory Distress] : no respiratory distress  [No Accessory Muscle Use] : no accessory muscle use [Clear to Auscultation] : lungs were clear to auscultation bilaterally [Normal Rate] : normal rate  [Regular Rhythm] : with a regular rhythm [Normal S1, S2] : normal S1 and S2 [No Murmur] : no murmur heard [No Carotid Bruits] : no carotid bruits [No Abdominal Bruit] : a ~M bruit was not heard ~T in the abdomen [No Varicosities] : no varicosities [Pedal Pulses Present] : the pedal pulses are present [No Edema] : there was no peripheral edema [No Palpable Aorta] : no palpable aorta [No Extremity Clubbing/Cyanosis] : no extremity clubbing/cyanosis [Declined Breast Exam] : declined breast exam  [Soft] : abdomen soft [Non Tender] : non-tender [Non-distended] : non-distended [No Masses] : no abdominal mass palpated [No HSM] : no HSM [Normal Bowel Sounds] : normal bowel sounds [Normal Posterior Cervical Nodes] : no posterior cervical lymphadenopathy [Normal Anterior Cervical Nodes] : no anterior cervical lymphadenopathy [No CVA Tenderness] : no CVA  tenderness [No Spinal Tenderness] : no spinal tenderness [No Joint Swelling] : no joint swelling [Grossly Normal Strength/Tone] : grossly normal strength/tone [Coordination Grossly Intact] : coordination grossly intact [No Focal Deficits] : no focal deficits [Normal Gait] : normal gait [Deep Tendon Reflexes (DTR)] : deep tendon reflexes were 2+ and symmetric [Normal Affect] : the affect was normal [Normal Insight/Judgement] : insight and judgment were intact [de-identified] : impacted dental roots [de-identified] : irritation under abdiminal fold

## 2022-07-20 LAB
HBA1C MFR BLD HPLC: 7.8
LDLC SERPL DIRECT ASSAY-MCNC: 74
MICROALBUMIN/CREAT 24H UR-RTO: 22

## 2022-07-22 ENCOUNTER — APPOINTMENT (OUTPATIENT)
Dept: ENDOCRINOLOGY | Facility: CLINIC | Age: 72
End: 2022-07-22

## 2022-07-25 ENCOUNTER — RX RENEWAL (OUTPATIENT)
Age: 72
End: 2022-07-25

## 2022-07-29 ENCOUNTER — APPOINTMENT (OUTPATIENT)
Dept: ENDOCRINOLOGY | Facility: CLINIC | Age: 72
End: 2022-07-29

## 2022-07-29 VITALS
DIASTOLIC BLOOD PRESSURE: 70 MMHG | WEIGHT: 270 LBS | HEART RATE: 70 BPM | BODY MASS INDEX: 47.84 KG/M2 | HEIGHT: 63 IN | SYSTOLIC BLOOD PRESSURE: 128 MMHG

## 2022-07-29 LAB
GLUCOSE BLDC GLUCOMTR-MCNC: 125
HBA1C MFR BLD HPLC: 7.6

## 2022-07-29 PROCEDURE — 82962 GLUCOSE BLOOD TEST: CPT

## 2022-07-29 PROCEDURE — 83036 HEMOGLOBIN GLYCOSYLATED A1C: CPT | Mod: QW

## 2022-07-29 PROCEDURE — 99214 OFFICE O/P EST MOD 30 MIN: CPT | Mod: 25

## 2022-07-29 NOTE — REVIEW OF SYSTEMS
[Fatigue] : fatigue [Back Pain] : back pain [Tremors] : tremors [Swelling] : swelling [Decreased Appetite] : appetite not decreased [Recent Weight Gain (___ Lbs)] : no recent weight gain [Recent Weight Loss (___ Lbs)] : no recent weight loss [Visual Field Defect] : no visual field defect [Blurred Vision] : no blurred vision [Dysphagia] : no dysphagia [Neck Pain] : no neck pain [Dysphonia] : no dysphonia [Chest Pain] : no chest pain [Palpitations] : no palpitations [Constipation] : no constipation [Diarrhea] : no diarrhea [Polyuria] : no polyuria [Dysuria] : no dysuria [Dry Skin] : no dry skin [Hair Loss] : no hair loss [Headaches] : no headaches [Depression] : no depression [Anxiety] : no anxiety [Polydipsia] : no polydipsia [FreeTextEntry2] : weight stable [de-identified] : chronic left hand tremor [de-identified] : chronic left leg swelling

## 2022-07-29 NOTE — HISTORY OF PRESENT ILLNESS
[FreeTextEntry1] : \par   \par Quality:  pancreatic/ type 2 DM\par Severity:  moderate\par Duration of diabetes:  since 2009\par Onset:  occurred after severe pancreatitis in 2009\par Associated Complications/ Symptoms:  microalbuminuria\par Modifying Factors:  Better with insulin\par \par SMBG: tests blood glucose 2-3 times per day.     Does not want to use CGM.    Was lax with testing, but has increased over the past week.   7 day average is now 142 mg/dl, 30 day average is 150 mg/dl.  \par Current  - fasting \par \par Current Diabetic Medication Regimen:\par Tresiba 85 units qAM\par Novolog 45/45/35 units\par Pioglitazone 15 mg daily \par \par Intolerant to Jardiance due to yeast infection.   \par Intolerant to metformin, cannot take GLP-1 due to history of pancreatitis.  \par Wore Thelma Pro in 5/201.   \par  \par Had some epidural injections recently.  \par Had procedure done for back pain last week (nerve ablation) and reports improved pain.  \par Admits to some dietary indiscretion.  \par  \par Last eye exam: Last week (-) DR\par Last foot exam: none, denies neuropathy

## 2022-07-29 NOTE — ASSESSMENT
[FreeTextEntry1] : 72 year old female with DM (type 2 versus element of pancreatic DM), HTN and hyperlipidemia.  Her blood sugars are improving however A1c slightly worse since previous. \par \par Patient is cleared by endocrinology standpoint for oral surgery on August 8, 2022. \par \par 1.  Type 2 DM-   History of many drug intolerances. Increase pioglitazone to 30 mg daily.  Highly recommended trial of CGM such as dexcom, but patient remains hesitant.   Stressed the importance to increase SMBG and improve diet. Repeat A1c before next visit. \par \par 2. Hyperlipidemia- continue atorvastatin, repeat lipids before next visit. \par \par 3. HTN- BP acceptable, continue lisinopril\par \par 4. Vitamin D insufficiency-  Continue Vitamin D2 50,000 IU every 2 weeks.  \par \par RTO in 3-4 month with Dr. Hussein.

## 2022-07-29 NOTE — PHYSICAL EXAM
[Alert] : alert [No Acute Distress] : no acute distress [Oriented x3] : oriented to person, place, and time [Normal Affect] : the affect was normal [Normal Insight/Judgement] : insight and judgment were intact [Normal Mood] : the mood was normal [Obese] : obese [Normal Sclera/Conjunctiva] : normal sclera/conjunctiva [No Proptosis] : no proptosis [No LAD] : no lymphadenopathy [Thyroid Not Enlarged] : the thyroid was not enlarged [No Thyroid Nodules] : no palpable thyroid nodules [No Respiratory Distress] : no respiratory distress [No Accessory Muscle Use] : no accessory muscle use [Normal Rate and Effort] : normal respiratory rate and effort [Clear to Auscultation] : lungs were clear to auscultation bilaterally [Normal Bowel Sounds] : normal bowel sounds [Not Tender] : non-tender [Soft] : abdomen soft [No Rash] : no rash [Acanthosis Nigricans] : no acanthosis nigricans [de-identified] : Left > Right foot [de-identified] : ambulates with cane [de-identified] : Pt. declined foot examination [de-identified] : left hand tremor

## 2022-08-05 RX ORDER — PEN NEEDLE, DIABETIC 29 G X1/2"
31G X 5 MM NEEDLE, DISPOSABLE MISCELLANEOUS
Qty: 5 | Refills: 3 | Status: ACTIVE | COMMUNITY
Start: 2019-03-26 | End: 1900-01-01

## 2022-09-20 ENCOUNTER — RX RENEWAL (OUTPATIENT)
Age: 72
End: 2022-09-20

## 2022-10-17 ENCOUNTER — RX RENEWAL (OUTPATIENT)
Age: 72
End: 2022-10-17

## 2022-10-18 ENCOUNTER — APPOINTMENT (OUTPATIENT)
Dept: FAMILY MEDICINE | Facility: CLINIC | Age: 72
End: 2022-10-18

## 2022-10-18 VITALS
DIASTOLIC BLOOD PRESSURE: 86 MMHG | WEIGHT: 273 LBS | OXYGEN SATURATION: 15 % | SYSTOLIC BLOOD PRESSURE: 130 MMHG | TEMPERATURE: 97.8 F | HEART RATE: 90 BPM | RESPIRATION RATE: 15 BRPM | BODY MASS INDEX: 48.37 KG/M2 | HEIGHT: 63 IN

## 2022-10-18 DIAGNOSIS — Z12.39 ENCOUNTER FOR OTHER SCREENING FOR MALIGNANT NEOPLASM OF BREAST: ICD-10-CM

## 2022-10-18 DIAGNOSIS — B37.9 CANDIDIASIS, UNSPECIFIED: ICD-10-CM

## 2022-10-18 DIAGNOSIS — Z00.00 ENCOUNTER FOR GENERAL ADULT MEDICAL EXAMINATION W/OUT ABNORMAL FINDINGS: ICD-10-CM

## 2022-10-18 DIAGNOSIS — B37.2 CANDIDIASIS OF SKIN AND NAIL: ICD-10-CM

## 2022-10-18 PROCEDURE — 99213 OFFICE O/P EST LOW 20 MIN: CPT

## 2022-10-18 NOTE — HEALTH RISK ASSESSMENT
[No falls in past year] : Patient reported no falls in the past year [0] : 2) Feeling down, depressed, or hopeless: Not at all (0) [PHQ-2 Negative - No further assessment needed] : PHQ-2 Negative - No further assessment needed [[id number]] : 0

## 2022-10-18 NOTE — HISTORY OF PRESENT ILLNESS
[FreeTextEntry1] : skin rash, med refills [de-identified] : Patient presents for routine medication refills. She is compliant with medications. Complains of a continued skin rash despite taking nystatin cream. Rash on on her lower abdomen. She denies pain or discharge. States it is itching. No change in size.

## 2022-10-18 NOTE — PLAN
[FreeTextEntry1] : candidiasis- start clotrimazole, if no improvement in 1 month then follow up with derm. derm referral given.\par \par HTN- bp well controlled at 130/86. taking carvedilol and lisinopril/hctz. Medications reviewed and renewed.\par \par Hyperlipidemia- taking atorvastatin 80mg. Medications reviewed and renewed. fasting labs with rheumatologist/endo. \par \par f/u in 3 months.

## 2022-10-23 ENCOUNTER — RX RENEWAL (OUTPATIENT)
Age: 72
End: 2022-10-23

## 2022-11-23 ENCOUNTER — RX RENEWAL (OUTPATIENT)
Age: 72
End: 2022-11-23

## 2022-12-07 ENCOUNTER — APPOINTMENT (OUTPATIENT)
Dept: ENDOCRINOLOGY | Facility: CLINIC | Age: 72
End: 2022-12-07

## 2023-01-17 ENCOUNTER — NON-APPOINTMENT (OUTPATIENT)
Age: 73
End: 2023-01-17

## 2023-01-17 ENCOUNTER — APPOINTMENT (OUTPATIENT)
Dept: FAMILY MEDICINE | Facility: CLINIC | Age: 73
End: 2023-01-17
Payer: MEDICARE

## 2023-01-17 VITALS
HEART RATE: 77 BPM | RESPIRATION RATE: 15 BRPM | TEMPERATURE: 97.6 F | DIASTOLIC BLOOD PRESSURE: 70 MMHG | SYSTOLIC BLOOD PRESSURE: 144 MMHG | OXYGEN SATURATION: 93 % | BODY MASS INDEX: 46.95 KG/M2 | HEIGHT: 63 IN | WEIGHT: 265 LBS

## 2023-01-17 DIAGNOSIS — Z20.822 CONTACT WITH AND (SUSPECTED) EXPOSURE TO COVID-19: ICD-10-CM

## 2023-01-17 DIAGNOSIS — R06.00 DYSPNEA, UNSPECIFIED: ICD-10-CM

## 2023-01-17 PROCEDURE — 99214 OFFICE O/P EST MOD 30 MIN: CPT

## 2023-01-17 NOTE — REVIEW OF SYSTEMS
[Shortness Of Breath] : shortness of breath [Cough] : cough [Negative] : Heme/Lymph [FreeTextEntry6] : Since covid x 1 month

## 2023-01-17 NOTE — HISTORY OF PRESENT ILLNESS
[FreeTextEntry1] : Presents today for refills.States she has been sick since December 2022 with Covid.  Mild SOB persist.as well as Dry nonproductive cough.  States She had a colonoscopy with [facility] 4 yrs ago.  Declines doing a fit test  at current time.  Follows with Pulmonary, Cardio and endo.

## 2023-01-18 ENCOUNTER — APPOINTMENT (OUTPATIENT)
Dept: PULMONOLOGY | Facility: CLINIC | Age: 73
End: 2023-01-18
Payer: MEDICARE

## 2023-01-18 ENCOUNTER — NON-APPOINTMENT (OUTPATIENT)
Age: 73
End: 2023-01-18

## 2023-01-18 VITALS
OXYGEN SATURATION: 94 % | TEMPERATURE: 97.6 F | WEIGHT: 265 LBS | DIASTOLIC BLOOD PRESSURE: 88 MMHG | HEIGHT: 63 IN | HEART RATE: 72 BPM | SYSTOLIC BLOOD PRESSURE: 148 MMHG | BODY MASS INDEX: 46.95 KG/M2

## 2023-01-18 PROCEDURE — 94010 BREATHING CAPACITY TEST: CPT

## 2023-01-18 PROCEDURE — 99214 OFFICE O/P EST MOD 30 MIN: CPT | Mod: 25

## 2023-01-18 RX ORDER — PNV NO.95/FERROUS FUM/FOLIC AC 28MG-0.8MG
100 TABLET ORAL
Refills: 0 | Status: ACTIVE | COMMUNITY

## 2023-01-18 NOTE — REASON FOR VISIT
[Follow-Up] : a follow-up visit [Chest Pain] : chest pain [Cough] : cough [Shortness of Breath] : shortness of breath [Wheezing] : wheezing [TextBox_44] : interstitial lung disease, 6 months. Pt had COVID Dec 2022. Pt states since then she has been experiencing increasing SOB, a dry cough, chest tightness and some wheezing.

## 2023-01-18 NOTE — REVIEW OF SYSTEMS
[Cough] : cough [Chest Tightness] : chest tightness [Dyspnea] : dyspnea [Wheezing] : wheezing [SOB on Exertion] : sob on exertion [Seasonal Allergies] : no seasonal allergies [Diabetes] : diabetes [Thyroid Problem] : no thyroid problem [Obesity] : obesity [Negative] : Psychiatric [TextBox_14] : sx post cataract surgery  [TextBox_94] : KENDRA membreno now has back issues

## 2023-01-18 NOTE — ASSESSMENT
[FreeTextEntry1] : The patient is doing well  but is limited by the RA and obesity  I told her at age 71 the extra weight will further exaggerate her functional status  She has stable ILD and she had a PFT which was stable  She had a stable Ct scan in 9/21 She will need a PFt in 6mos  and a Ct in one year \par \par the patient is clinically stable and her PFTs are stable The DLCO is slightly better and the FVC is slightly lower all within the range of test error    She is to hava f/u Ct scan in ~ 6months   which would be her yearly scan   \par \par 7/13/22  The patient is doing well except she has not been able to lose weight but it has not gone up  She had a Ct scan of the chest and it is stable, and reviewed with the patient   A PFT has not been done this year  She will have a PFT and visit in 6 months and  then again in one year a visit and  a PFT    time spent 30minutes  counselling, education, review of imaging and PE/HX  \par \par 1/18/2023 the patient is post COVID with a chronic cough.  Patient states that certain positions she coughs and wheezes more than others.  Patient's cough is worse in the evenings.  The patient had a spirometry which showed that her FEV1 was 1.9 L 96% of predicted..  Patient does not need any bronchodilation at this time patient has irritable bronchial tubes and a steroid inhaler will be ordered to decrease the inflammation in her bronchial tubes and suppress her cough.  Patient is due back in the office on the 24th for pulmonary function test to further evaluate her interstitial disease time spent 30 minutes counseling, education, documentation, imaging reviewed, medication reviewed, inhaler demonstrated, old records reviewed, HX and PE follow-up in 2 months.  Patient will call if the steroid inhaler is not effective

## 2023-01-18 NOTE — HISTORY OF PRESENT ILLNESS
[TextBox_4] : the patient is 70 year F with an abnormal Ct scan c/w early ILD  She has had a stable Ct scan and a stable PFT  She ios s/p TKR- R knee and then recently cataract surgery  She is still on opthalmic steroids  She has RA and had taken methotrexate in the past \par \par 9/24/21 The patient is a 70 yo with Rheum A and she  has mild ILD She is off methotrexate and is on orencia and NSAIDS   She had a PFT today \par \par 7/13/22 The patient has no new complaints  She has no cough, wheeze, or new dyspnea  \par \par 1/18/2023: Patient states she is been well during the interval except recently after she acquired COVID in December.  The patient has been helping her son with babysitting for her grandchild.  Patient "COVID through her son's family but has  residual symptoms of cough and bronchospasm.  Has no sputum production the patient did not have to be hospitalized with a COVID nor did she have severe respiratory symptoms

## 2023-01-24 ENCOUNTER — APPOINTMENT (OUTPATIENT)
Dept: PULMONOLOGY | Facility: CLINIC | Age: 73
End: 2023-01-24
Payer: MEDICARE

## 2023-01-24 PROCEDURE — 94010 BREATHING CAPACITY TEST: CPT

## 2023-01-24 PROCEDURE — 94727 GAS DIL/WSHOT DETER LNG VOL: CPT

## 2023-01-24 PROCEDURE — 94729 DIFFUSING CAPACITY: CPT

## 2023-02-02 ENCOUNTER — OFFICE (OUTPATIENT)
Dept: URBAN - METROPOLITAN AREA CLINIC 104 | Facility: CLINIC | Age: 73
Setting detail: OPHTHALMOLOGY
End: 2023-02-02
Payer: MEDICARE

## 2023-02-02 DIAGNOSIS — H01.002: ICD-10-CM

## 2023-02-02 DIAGNOSIS — H18.513: ICD-10-CM

## 2023-02-02 DIAGNOSIS — H01.004: ICD-10-CM

## 2023-02-02 DIAGNOSIS — H26.492: ICD-10-CM

## 2023-02-02 DIAGNOSIS — E11.9: ICD-10-CM

## 2023-02-02 DIAGNOSIS — H01.001: ICD-10-CM

## 2023-02-02 DIAGNOSIS — H53.002: ICD-10-CM

## 2023-02-02 DIAGNOSIS — Z79.4: ICD-10-CM

## 2023-02-02 DIAGNOSIS — Z94.7: ICD-10-CM

## 2023-02-02 DIAGNOSIS — H01.005: ICD-10-CM

## 2023-02-02 DIAGNOSIS — Z96.1: ICD-10-CM

## 2023-02-02 DIAGNOSIS — H04.123: ICD-10-CM

## 2023-02-02 PROCEDURE — 99213 OFFICE O/P EST LOW 20 MIN: CPT | Performed by: SPECIALIST

## 2023-02-02 ASSESSMENT — VISUAL ACUITY
OD_BCVA: 20/40
OS_BCVA: 20/20-2

## 2023-02-02 ASSESSMENT — SPHEQUIV_DERIVED
OD_SPHEQUIV: 0
OS_SPHEQUIV: 1.625

## 2023-02-02 ASSESSMENT — TONOMETRY
OS_IOP_MMHG: 18
OD_IOP_MMHG: 18

## 2023-02-02 ASSESSMENT — KERATOMETRY
OD_AXISANGLE_DEGREES: 115
OS_K2POWER_DIOPTERS: 43.72
OS_K1POWER_DIOPTERS: 43.38
OD_K1POWER_DIOPTERS: 43.05
OD_K2POWER_DIOPTERS: 43.77
OS_AXISANGLE_DEGREES: 034

## 2023-02-02 ASSESSMENT — TEAR BREAK UP TIME (TBUT)
OS_TBUT: 1+
OD_TBUT: 1+

## 2023-02-02 ASSESSMENT — AXIALLENGTH_DERIVED
OS_AL: 22.96
OD_AL: 23.625

## 2023-02-02 ASSESSMENT — REFRACTION_AUTOREFRACTION
OS_SPHERE: +2.00
OD_CYLINDER: -1.00
OS_CYLINDER: -0.75
OS_AXIS: 066
OD_AXIS: 065
OD_SPHERE: +0.50

## 2023-02-02 ASSESSMENT — LID EXAM ASSESSMENTS
OD_BLEPHARITIS: RLL RUL 1+
OS_BLEPHARITIS: LLL LUL 1+

## 2023-02-02 ASSESSMENT — CORNEAL DYSTROPHY - POSTERIOR: OD_POSTERIORDYSTROPHY: 3+ 4+ GUTTATA

## 2023-02-02 ASSESSMENT — CONFRONTATIONAL VISUAL FIELD TEST (CVF)
OD_FINDINGS: FULL
OS_FINDINGS: FULL

## 2023-02-20 ENCOUNTER — RX RENEWAL (OUTPATIENT)
Age: 73
End: 2023-02-20

## 2023-03-20 ENCOUNTER — APPOINTMENT (OUTPATIENT)
Dept: PULMONOLOGY | Facility: CLINIC | Age: 73
End: 2023-03-20
Payer: MEDICARE

## 2023-03-20 VITALS
OXYGEN SATURATION: 96 % | SYSTOLIC BLOOD PRESSURE: 118 MMHG | HEIGHT: 63 IN | TEMPERATURE: 98 F | BODY MASS INDEX: 47.84 KG/M2 | DIASTOLIC BLOOD PRESSURE: 84 MMHG | HEART RATE: 60 BPM | WEIGHT: 270 LBS

## 2023-03-20 PROCEDURE — 99215 OFFICE O/P EST HI 40 MIN: CPT

## 2023-03-20 RX ORDER — AMOXICILLIN 500 MG/1
500 TABLET, FILM COATED ORAL
Qty: 30 | Refills: 0 | Status: DISCONTINUED | COMMUNITY
Start: 2022-05-16 | End: 2023-03-20

## 2023-03-20 RX ORDER — FLUTICASONE FUROATE 200 UG/1
200 POWDER RESPIRATORY (INHALATION) DAILY
Qty: 1 | Refills: 6 | Status: DISCONTINUED | COMMUNITY
Start: 2023-01-18 | End: 2023-03-20

## 2023-04-18 ENCOUNTER — APPOINTMENT (OUTPATIENT)
Dept: FAMILY MEDICINE | Facility: CLINIC | Age: 73
End: 2023-04-18
Payer: MEDICARE

## 2023-04-18 VITALS
OXYGEN SATURATION: 93 % | SYSTOLIC BLOOD PRESSURE: 130 MMHG | WEIGHT: 268 LBS | TEMPERATURE: 97.7 F | HEART RATE: 75 BPM | DIASTOLIC BLOOD PRESSURE: 70 MMHG | HEIGHT: 63 IN | BODY MASS INDEX: 47.48 KG/M2 | RESPIRATION RATE: 15 BRPM

## 2023-04-18 PROCEDURE — 99215 OFFICE O/P EST HI 40 MIN: CPT

## 2023-04-18 NOTE — PLAN
[FreeTextEntry1] : Follow with Endo, cardio and Pulmonary\par Medications renewed\par Labs reviewed with the pt\par healthy diet

## 2023-04-18 NOTE — HISTORY OF PRESENT ILLNESS
[FreeTextEntry1] : Presents today for refills.  Follows with endo- Dr Toney and Pulmonary Dr Trevino.    Labs from endo reviewed with patient. HgbA1c 7.7  stable.  Offers no complaints or concerns.

## 2023-04-25 ENCOUNTER — APPOINTMENT (OUTPATIENT)
Dept: ENDOCRINOLOGY | Facility: CLINIC | Age: 73
End: 2023-04-25
Payer: MEDICARE

## 2023-04-25 LAB
HBA1C MFR BLD HPLC: 7.7
LDLC SERPL CALC-MCNC: 84
MICROALBUMIN/CREAT 24H UR-RTO: 22

## 2023-04-25 PROCEDURE — 99214 OFFICE O/P EST MOD 30 MIN: CPT | Mod: 95

## 2023-04-25 NOTE — REVIEW OF SYSTEMS
[Recent Weight Loss (___ Lbs)] : recent weight loss: [unfilled] lbs [Lower Ext Edema] : lower extremity edema

## 2023-04-25 NOTE — ASSESSMENT
[FreeTextEntry1] : 73 year old female with DM (type 2 versus element of pancreatic DM), HTN and hyperlipidemia here for follow up.  Her diabetes control is stable, but slightly above target.    \par \par 1.  Type 2 DM-    Continue current doses of insulin and pioglitazone.  Advised patient to increase SMBG to 4 times a day.  Again recommended CGM therapy, and patient will consider Dexcom at her next appointment.   \par 2. Hyperlipidemia- continue atorvastatin\par 3. HTN- continue lisinopril\par 4. Vitamin D insufficiency-  Continue Vitamin D2 50,000 IU every 2 weeks.  \par \par Follow up in 4 months.

## 2023-04-25 NOTE — HISTORY OF PRESENT ILLNESS
[Home] : at home, [unfilled] , at the time of the visit. [Medical Office: ([facility])___] : at the medical office located in  [Verbal consent obtained from patient] : the patient, [unfilled] [FreeTextEntry1] : Telehealth Visit Conducted.\par Time started:  5:03 PM\par Time Ended:   5:15 PM\par \par Follow up DM, Hyperlipidemia and HTN.\par   \par Quality:  pancreatic/ type 2 DM\par Severity:  moderate\par Duration of diabetes:  since 2009\par Onset:  occurred after severe pancreatitis in 2009\par Associated Complications/ Symptoms:  microalbuminuria\par Modifying Factors:  Better with insulin\par \par SMBG: tests blood glucose 2-3 times per day.    Has been hesitant to use CGM in the past.    \par \par Current Diabetic Medication Regimen:\par Tresiba 60 units qAM\par Novolog 35- 40 unit with meals  \par Pioglitazone 30 mg daily  (reports improved BG and lower insulin requirements since starting this)\par Intolerant to Jardiance due to yeast infection.   \par Intolerant to metformin, cannot take GLP-1 due to history of pancreatitis.  \par Wore Thelma Pro in 5/201.   \par  \par She has recently lost 5 pounds.  Has been eating less overall and helping care for her grandchild.     \par

## 2023-06-27 ENCOUNTER — APPOINTMENT (OUTPATIENT)
Dept: PULMONOLOGY | Facility: CLINIC | Age: 73
End: 2023-06-27
Payer: MEDICARE

## 2023-06-27 PROCEDURE — 94010 BREATHING CAPACITY TEST: CPT

## 2023-06-27 PROCEDURE — 94729 DIFFUSING CAPACITY: CPT

## 2023-06-27 PROCEDURE — 94727 GAS DIL/WSHOT DETER LNG VOL: CPT

## 2023-07-06 ENCOUNTER — NON-APPOINTMENT (OUTPATIENT)
Age: 73
End: 2023-07-06

## 2023-07-08 NOTE — REVIEW OF SYSTEMS
[Cough] : cough [Chest Tightness] : chest tightness [Dyspnea] : dyspnea [Wheezing] : wheezing [SOB on Exertion] : sob on exertion [Diabetes] : diabetes [Obesity] : obesity [Negative] : Psychiatric [Seasonal Allergies] : no seasonal allergies [Thyroid Problem] : no thyroid problem [TextBox_14] : sx post cataract surgery  [TextBox_94] : KENDRA membreno now has back issues

## 2023-07-08 NOTE — ASSESSMENT
[FreeTextEntry1] : The patient is doing well  but is limited by the RA and obesity  I told her at age 71 the extra weight will further exaggerate her functional status  She has stable ILD and she had a PFT which was stable  She had a stable Ct scan in 9/21 She will need a PFt in 6mos  and a Ct in one year \par \par the patient is clinically stable and her PFTs are stable The DLCO is slightly better and the FVC is slightly lower all within the range of test error    She is to hava f/u Ct scan in ~ 6months   which would be her yearly scan   \par \par 7/13/22  The patient is doing well except she has not been able to lose weight but it has not gone up  She had a Ct scan of the chest and it is stable, and reviewed with the patient   A PFT has not been done this year  She will have a PFT and visit in 6 months and  then again in one year a visit and  a PFT    time spent 30minutes  counselling, education, review of imaging and PE/HX  \par \par 1/18/2023 the patient is post COVID with a chronic cough.  Patient states that certain positions she coughs and wheezes more than others.  Patient's cough is worse in the evenings.  The patient had a spirometry which showed that her FEV1 was 1.9 L 96% of predicted..  Patient does not need any bronchodilation at this time patient has irritable bronchial tubes and a steroid inhaler will be ordered to decrease the inflammation in her bronchial tubes and suppress her cough.  Patient is due back in the office on the 24th for pulmonary function test to further evaluate her interstitial disease time spent 30 minutes counseling, education, documentation, imaging reviewed, medication reviewed, inhaler demonstrated, old records reviewed, HX and PE follow-up in 2 months.  Patient will call if the steroid inhaler is not effective\par \par 3/20/23 The patient is clincally stable  She has less cough   Her PFT 1/24/23  had a DLCO of 54% and a Sp DLCO of 76% , on 9/2021  DLCO 86  spDLCO 94%  1/11/21 DLCO 63%  and spDLCO 63 %  although the DLCO is only 54%  it is 76 % on SpDLCO is more accurate as it is adjusted for [place]   Also could be lower than 9/2021 b/o anemia   will check Hgb level     f/u PFT and CT scan in one year  time spent 40 min  counseling, education, documentation, imaging reviewed, medication reviewed, old records reviewed, HX and PE 
4

## 2023-07-08 NOTE — HISTORY OF PRESENT ILLNESS
[Never] : never [TextBox_4] : 3/20/23  the patient is feeing well  and has had  her cough resolved by the  trelegy sample   given to her  on her last visit    \par She has not been told she is anemic  as that could effect the results of the DLcO

## 2023-07-08 NOTE — REASON FOR VISIT
[Follow-Up] : a follow-up visit [Pulmonary Fibrosis] : pulmonary fibrosis [TextBox_44] : 2 month.  Patient states she is not coughing anymore she finished the inhaler about a month ago.  No pulmonary complaints today. EKG

## 2023-07-10 ENCOUNTER — APPOINTMENT (OUTPATIENT)
Dept: PULMONOLOGY | Facility: CLINIC | Age: 73
End: 2023-07-10
Payer: MEDICARE

## 2023-07-10 VITALS
HEART RATE: 66 BPM | SYSTOLIC BLOOD PRESSURE: 120 MMHG | DIASTOLIC BLOOD PRESSURE: 70 MMHG | WEIGHT: 263 LBS | OXYGEN SATURATION: 95 % | TEMPERATURE: 96.9 F | HEIGHT: 63 IN | BODY MASS INDEX: 46.6 KG/M2

## 2023-07-10 DIAGNOSIS — J70.3: ICD-10-CM

## 2023-07-10 PROCEDURE — 99214 OFFICE O/P EST MOD 30 MIN: CPT

## 2023-07-10 RX ORDER — FOLIC ACID 20 MG
CAPSULE ORAL
Refills: 0 | Status: ACTIVE | COMMUNITY

## 2023-07-10 RX ORDER — LACTOBACILLUS ACIDOPHILUS/PECT 30 MG-20MG
TABLET ORAL
Refills: 0 | Status: ACTIVE | COMMUNITY

## 2023-07-10 RX ORDER — DICLOFENAC SODIUM 10 MG/G
1 GEL TOPICAL
Refills: 0 | Status: ACTIVE | COMMUNITY

## 2023-07-10 RX ORDER — CHLORHEXIDINE GLUCONATE, 0.12% ORAL RINSE 1.2 MG/ML
0.12 SOLUTION DENTAL
Qty: 473 | Refills: 0 | Status: DISCONTINUED | COMMUNITY
Start: 2022-05-16 | End: 2023-07-10

## 2023-07-10 RX ORDER — TRAMADOL HYDROCHLORIDE 25 MG/1
TABLET, COATED ORAL
Refills: 0 | Status: ACTIVE | COMMUNITY

## 2023-07-10 RX ORDER — CLOTRIMAZOLE 10 MG/G
1 CREAM TOPICAL TWICE DAILY
Qty: 1 | Refills: 3 | Status: DISCONTINUED | COMMUNITY
Start: 2022-10-18 | End: 2023-07-10

## 2023-07-10 RX ORDER — NIFEDIPINE 30 MG/1
30 TABLET, FILM COATED, EXTENDED RELEASE ORAL
Qty: 180 | Refills: 0 | Status: ACTIVE | COMMUNITY
Start: 2023-07-02

## 2023-07-10 RX ORDER — CELECOXIB 50 MG/1
CAPSULE ORAL
Refills: 0 | Status: ACTIVE | COMMUNITY

## 2023-07-10 RX ORDER — PIOGLITAZONE HYDROCHLORIDE 30 MG/1
30 TABLET ORAL
Qty: 90 | Refills: 0 | Status: DISCONTINUED | COMMUNITY
Start: 2022-02-25 | End: 2023-07-10

## 2023-07-10 RX ORDER — NYSTATIN AND TRIAMCINOLONE ACETONIDE 100000; 1 MG/G; MG/G
100000-0.1 CREAM TOPICAL
Qty: 15 | Refills: 2 | Status: DISCONTINUED | COMMUNITY
Start: 2022-07-19 | End: 2023-07-10

## 2023-07-10 NOTE — ASSESSMENT
[FreeTextEntry1] : The patient is doing well  but is limited by the RA and obesity  I told her at age 71 the extra weight will further exaggerate her functional status  She has stable ILD and she had a PFT which was stable  She had a stable Ct scan in 9/21 She will need a PFt in 6mos  and a Ct in one year \par \par the patient is clinically stable and her PFTs are stable The DLCO is slightly better and the FVC is slightly lower all within the range of test error    She is to hava f/u Ct scan in ~ 6months   which would be her yearly scan   \par \par 7/13/22  The patient is doing well except she has not been able to lose weight but it has not gone up  She had a Ct scan of the chest and it is stable, and reviewed with the patient   A PFT has not been done this year  She will have a PFT and visit in 6 months and  then again in one year a visit and  a PFT    time spent 30minutes  counselling, education, review of imaging and PE/HX  \par \par 1/18/2023 the patient is post COVID with a chronic cough.  Patient states that certain positions she coughs and wheezes more than others.  Patient's cough is worse in the evenings.  The patient had a spirometry which showed that her FEV1 was 1.9 L 96% of predicted..  Patient does not need any bronchodilation at this time patient has irritable bronchial tubes and a steroid inhaler will be ordered to decrease the inflammation in her bronchial tubes and suppress her cough.  Patient is due back in the office on the 24th for pulmonary function test to further evaluate her interstitial disease time spent 30 minutes counseling, education, documentation, imaging reviewed, medication reviewed, inhaler demonstrated, old records reviewed, HX and PE follow-up in 2 months.  Patient will call if the steroid inhaler is not effective\par \par 3/20/23 The patient is clincally stable  She has less cough   Her PFT 1/24/23  had a DLCO of 54% and a Sp DLCO of 76% , on 9/2021  DLCO 86  spDLCO 94%  1/11/21 DLCO 63%  and spDLCO 63 %  although the DLCO is only 54%  it is 76 % on SpDLCO is more accurate as it is adjusted for [place]   Also could be lower than 9/2021 b/o anemia   will check Hgb level     f/u PFT and CT scan in one year  time spent 40 min  counseling, education, documentation, imaging reviewed, medication reviewed, old records reviewed, HX and PE \par \par 7/10/23  1) dlco on the most recnt PFT is in the same range as those preceding. The DLCOwas 60% and the spDLCO was 76   2) the patient was told about her HGB of 12.5 being less than a prior hgb in 2021     I will repeat the HGB   f/u 6months   time spent 30min  counseling, education, documentation, imaging reviewed, medication reviewed, old records reviewed, HX and PE

## 2023-07-10 NOTE — HISTORY OF PRESENT ILLNESS
[Never] : never [TextBox_4] : 3/20/23  the patient is feeing well  and has had  her cough resolved by the  trelegy sample   given to her  on her last visit    \par She has not been told she is anemic  as that could effect the results of the DLcO \par \par 77/10/23  The patient is a 74yo with ILD  and it is monitored with PFT and Ct  She had a PFt a few weeks ago  She reports feeling better.

## 2023-07-10 NOTE — REVIEW OF SYSTEMS
[Cough] : cough [Chest Tightness] : no chest tightness [Dyspnea] : dyspnea [Wheezing] : no wheezing [SOB on Exertion] : sob on exertion [Seasonal Allergies] : no seasonal allergies [Diabetes] : diabetes [Thyroid Problem] : no thyroid problem [Obesity] : obesity [Negative] : Psychiatric [TextBox_14] : sx post cataract surgery  [TextBox_30] : feels less PALMA   [TextBox_94] : KENDRA membreno now has back issues

## 2023-07-10 NOTE — REASON FOR VISIT
[Follow-Up] : a follow-up visit [Pulmonary Fibrosis] : pulmonary fibrosis [TextBox_44] : 3 months. Pt has no pulmonary issues at this time. PFT performed 6/27/2023.

## 2023-07-18 ENCOUNTER — APPOINTMENT (OUTPATIENT)
Dept: FAMILY MEDICINE | Facility: CLINIC | Age: 73
End: 2023-07-18
Payer: MEDICARE

## 2023-07-18 VITALS
DIASTOLIC BLOOD PRESSURE: 70 MMHG | RESPIRATION RATE: 16 BRPM | OXYGEN SATURATION: 97 % | BODY MASS INDEX: 46.78 KG/M2 | SYSTOLIC BLOOD PRESSURE: 120 MMHG | HEIGHT: 63 IN | TEMPERATURE: 97.6 F | HEART RATE: 129 BPM | WEIGHT: 264 LBS

## 2023-07-18 DIAGNOSIS — M79.606 PAIN IN LEG, UNSPECIFIED: ICD-10-CM

## 2023-07-18 DIAGNOSIS — D64.9 ANEMIA, UNSPECIFIED: ICD-10-CM

## 2023-07-18 PROCEDURE — 99214 OFFICE O/P EST MOD 30 MIN: CPT

## 2023-07-19 NOTE — HISTORY OF PRESENT ILLNESS
[FreeTextEntry1] : med refills\par thigh pain [de-identified] : Patient presents with continued right thigh pain. She follows with ortho and rheumatology. She was told by rheum it is likely a tendonitis related to her RA. But she is requesting and xray at this time. Educated patient that an xray will look at her femur and not show tendons/ligaments. She verbalizes understanding and would like an xray. \par She is also here for routine medication refills.

## 2023-07-19 NOTE — PLAN
[FreeTextEntry1] : HTN- bp 120/70, well controlled with carvedilol 3.125mg and lisinopril-hctz 20-25mg daily. this medication is effective. Medications reviewed and renewed. \par \par Hyperlipidemia- prescribed atorvastatin 80 mg daily. Medications reviewed and renewed. \par \par Was told she was anemic by her pulmonologist. Iron panel ordered- she will complete out of office.\par \par thigh pain- xray of femur ordered. patient will followup with her rheum and ortho after. \par \par f/u in 3 months

## 2023-07-31 NOTE — REVIEW OF SYSTEMS
PTL precautions.   Having fetal movmeent.   Occasional tightness in belly.   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  No bleeding or LOF     [Negative] : Heme/Lymph

## 2023-08-17 ENCOUNTER — OFFICE (OUTPATIENT)
Dept: URBAN - METROPOLITAN AREA CLINIC 104 | Facility: CLINIC | Age: 73
Setting detail: OPHTHALMOLOGY
End: 2023-08-17
Payer: MEDICARE

## 2023-08-17 DIAGNOSIS — H01.004: ICD-10-CM

## 2023-08-17 DIAGNOSIS — Z94.7: ICD-10-CM

## 2023-08-17 DIAGNOSIS — H01.002: ICD-10-CM

## 2023-08-17 DIAGNOSIS — E11.9: ICD-10-CM

## 2023-08-17 DIAGNOSIS — H01.001: ICD-10-CM

## 2023-08-17 DIAGNOSIS — H26.492: ICD-10-CM

## 2023-08-17 DIAGNOSIS — H04.123: ICD-10-CM

## 2023-08-17 DIAGNOSIS — H01.005: ICD-10-CM

## 2023-08-17 DIAGNOSIS — Z79.4: ICD-10-CM

## 2023-08-17 DIAGNOSIS — Z96.1: ICD-10-CM

## 2023-08-17 DIAGNOSIS — H18.513: ICD-10-CM

## 2023-08-17 DIAGNOSIS — H53.002: ICD-10-CM

## 2023-08-17 PROCEDURE — 92134 CPTRZ OPH DX IMG PST SGM RTA: CPT | Performed by: SPECIALIST

## 2023-08-17 PROCEDURE — 92014 COMPRE OPH EXAM EST PT 1/>: CPT | Performed by: SPECIALIST

## 2023-08-17 ASSESSMENT — TEAR BREAK UP TIME (TBUT)
OS_TBUT: 1+
OD_TBUT: 1+

## 2023-08-17 ASSESSMENT — CONFRONTATIONAL VISUAL FIELD TEST (CVF)
OS_FINDINGS: FULL
OD_FINDINGS: FULL

## 2023-08-17 ASSESSMENT — CORNEAL DYSTROPHY - POSTERIOR: OD_POSTERIORDYSTROPHY: 3+ 4+ GUTTATA

## 2023-08-17 ASSESSMENT — VISUAL ACUITY
OD_BCVA: 20/80
OS_BCVA: 20/25-1

## 2023-08-17 ASSESSMENT — KERATOMETRY
OS_K2POWER_DIOPTERS: 43.77
OS_AXISANGLE_DEGREES: 009
OS_K1POWER_DIOPTERS: 43.05
OD_K1POWER_DIOPTERS: 43.72
OD_K2POWER_DIOPTERS: 43.10
OD_AXISANGLE_DEGREES: 150

## 2023-08-17 ASSESSMENT — LID EXAM ASSESSMENTS
OD_BLEPHARITIS: RLL RUL 1+
OS_BLEPHARITIS: LLL LUL 1+

## 2023-08-17 ASSESSMENT — TONOMETRY
OS_IOP_MMHG: 17
OD_IOP_MMHG: 17

## 2023-08-17 ASSESSMENT — PACHYMETRY
OS_CT_UM: 703
OS_CT_CORRECTION: -7

## 2023-08-23 ENCOUNTER — RX ONLY (RX ONLY)
Age: 73
End: 2023-08-23

## 2023-08-23 ENCOUNTER — OFFICE (OUTPATIENT)
Dept: URBAN - METROPOLITAN AREA CLINIC 104 | Facility: CLINIC | Age: 73
Setting detail: OPHTHALMOLOGY
End: 2023-08-23
Payer: MEDICARE

## 2023-08-23 DIAGNOSIS — H43.391: ICD-10-CM

## 2023-08-23 DIAGNOSIS — H18.513: ICD-10-CM

## 2023-08-23 DIAGNOSIS — Z94.7: ICD-10-CM

## 2023-08-23 DIAGNOSIS — H26.492: ICD-10-CM

## 2023-08-23 DIAGNOSIS — H01.001: ICD-10-CM

## 2023-08-23 DIAGNOSIS — H01.002: ICD-10-CM

## 2023-08-23 PROCEDURE — 66821 AFTER CATARACT LASER SURGERY: CPT | Performed by: SPECIALIST

## 2023-08-23 PROCEDURE — 92134 CPTRZ OPH DX IMG PST SGM RTA: CPT | Performed by: SPECIALIST

## 2023-08-23 PROCEDURE — 99213 OFFICE O/P EST LOW 20 MIN: CPT | Performed by: SPECIALIST

## 2023-08-23 ASSESSMENT — SPHEQUIV_DERIVED
OS_SPHEQUIV: 1.25
OD_SPHEQUIV: 0.125
OS_SPHEQUIV: 1.75
OD_SPHEQUIV: -0.125

## 2023-08-23 ASSESSMENT — REFRACTION_MANIFEST
OS_SPHERE: +2.00
OS_VA1: 20/50
OS_AXIS: 95
OS_CYLINDER: -1.50
OD_SPHERE: +0.50
OD_AXIS: 50
OD_VA1: 20/25-2
OD_CYLINDER: -0.75

## 2023-08-23 ASSESSMENT — CORNEAL DYSTROPHY - POSTERIOR: OD_POSTERIORDYSTROPHY: 3+ 4+ GUTTATA

## 2023-08-23 ASSESSMENT — REFRACTION_AUTOREFRACTION
OD_CYLINDER: -0.75
OS_AXIS: 95
OD_AXIS: 50
OS_CYLINDER: -1.00
OD_SPHERE: +0.25
OS_SPHERE: +2.25

## 2023-08-23 ASSESSMENT — TEAR BREAK UP TIME (TBUT)
OD_TBUT: 1+
OS_TBUT: 1+

## 2023-08-23 ASSESSMENT — VISUAL ACUITY
OS_BCVA: 20/25-2
OD_BCVA: 20/70

## 2023-08-23 ASSESSMENT — LID EXAM ASSESSMENTS
OD_BLEPHARITIS: RLL RUL 1+
OS_BLEPHARITIS: LLL LUL 1+

## 2023-08-23 ASSESSMENT — CONFRONTATIONAL VISUAL FIELD TEST (CVF)
OS_FINDINGS: FULL
OD_FINDINGS: FULL

## 2023-09-07 ENCOUNTER — OFFICE (OUTPATIENT)
Dept: URBAN - METROPOLITAN AREA CLINIC 104 | Facility: CLINIC | Age: 73
Setting detail: OPHTHALMOLOGY
End: 2023-09-07
Payer: MEDICARE

## 2023-09-07 DIAGNOSIS — Z94.7: ICD-10-CM

## 2023-09-07 DIAGNOSIS — H01.005: ICD-10-CM

## 2023-09-07 DIAGNOSIS — H43.812: ICD-10-CM

## 2023-09-07 DIAGNOSIS — H04.123: ICD-10-CM

## 2023-09-07 DIAGNOSIS — E11.9: ICD-10-CM

## 2023-09-07 DIAGNOSIS — Z96.1: ICD-10-CM

## 2023-09-07 DIAGNOSIS — H43.391: ICD-10-CM

## 2023-09-07 DIAGNOSIS — H18.513: ICD-10-CM

## 2023-09-07 DIAGNOSIS — H53.002: ICD-10-CM

## 2023-09-07 DIAGNOSIS — H01.002: ICD-10-CM

## 2023-09-07 DIAGNOSIS — H01.004: ICD-10-CM

## 2023-09-07 DIAGNOSIS — H01.001: ICD-10-CM

## 2023-09-07 PROCEDURE — 99024 POSTOP FOLLOW-UP VISIT: CPT | Performed by: SPECIALIST

## 2023-09-07 ASSESSMENT — CORNEAL DYSTROPHY - POSTERIOR: OD_POSTERIORDYSTROPHY: 3+ 4+ GUTTATA

## 2023-09-07 ASSESSMENT — PACHYMETRY
OS_CT_CORRECTION: -7
OS_CT_UM: 703

## 2023-09-07 ASSESSMENT — REFRACTION_MANIFEST
OD_ADD: +2.50
OD_SPHERE: PLANO
OS_VA1: 20/30-1
OS_AXIS: 095
OS_SPHERE: +2.00
OD_CYLINDER: SPH
OS_CYLINDER: -1.75
OS_ADD: +2.50

## 2023-09-07 ASSESSMENT — VISUAL ACUITY
OD_BCVA: 20/60
OS_BCVA: 20/25

## 2023-09-07 ASSESSMENT — REFRACTION_AUTOREFRACTION
OS_CYLINDER: -2.00
OS_AXIS: 096
OS_SPHERE: +3.00

## 2023-09-07 ASSESSMENT — LID EXAM ASSESSMENTS
OS_BLEPHARITIS: LLL LUL 1+
OD_BLEPHARITIS: RLL RUL 1+

## 2023-09-07 ASSESSMENT — SPHEQUIV_DERIVED
OS_SPHEQUIV: 1.125
OS_SPHEQUIV: 2

## 2023-09-07 ASSESSMENT — CONFRONTATIONAL VISUAL FIELD TEST (CVF)
OS_FINDINGS: FULL
OD_FINDINGS: FULL

## 2023-09-07 ASSESSMENT — TONOMETRY: OS_IOP_MMHG: 15

## 2023-09-07 ASSESSMENT — TEAR BREAK UP TIME (TBUT)
OD_TBUT: 1+
OS_TBUT: 1+

## 2023-09-20 ENCOUNTER — APPOINTMENT (OUTPATIENT)
Dept: ENDOCRINOLOGY | Facility: CLINIC | Age: 73
End: 2023-09-20
Payer: MEDICARE

## 2023-09-20 VITALS
SYSTOLIC BLOOD PRESSURE: 110 MMHG | TEMPERATURE: 97.3 F | WEIGHT: 255.38 LBS | DIASTOLIC BLOOD PRESSURE: 70 MMHG | RESPIRATION RATE: 20 BRPM | HEART RATE: 72 BPM | BODY MASS INDEX: 43.6 KG/M2 | HEIGHT: 64 IN | OXYGEN SATURATION: 96 %

## 2023-09-20 LAB — GLUCOSE BLDC GLUCOMTR-MCNC: 143

## 2023-09-20 PROCEDURE — 82962 GLUCOSE BLOOD TEST: CPT

## 2023-09-20 PROCEDURE — 99214 OFFICE O/P EST MOD 30 MIN: CPT | Mod: 25

## 2023-09-20 RX ORDER — ERGOCALCIFEROL 1.25 MG/1
1.25 MG CAPSULE, LIQUID FILLED ORAL
Qty: 6 | Refills: 1 | Status: ACTIVE | COMMUNITY
Start: 2021-12-02 | End: 1900-01-01

## 2023-10-17 ENCOUNTER — APPOINTMENT (OUTPATIENT)
Dept: FAMILY MEDICINE | Facility: CLINIC | Age: 73
End: 2023-10-17
Payer: MEDICARE

## 2023-10-17 ENCOUNTER — MED ADMIN CHARGE (OUTPATIENT)
Age: 73
End: 2023-10-17

## 2023-10-17 VITALS
DIASTOLIC BLOOD PRESSURE: 80 MMHG | TEMPERATURE: 97.7 F | HEART RATE: 93 BPM | OXYGEN SATURATION: 99 % | BODY MASS INDEX: 43.23 KG/M2 | RESPIRATION RATE: 20 BRPM | HEIGHT: 64 IN | WEIGHT: 253.25 LBS | SYSTOLIC BLOOD PRESSURE: 140 MMHG

## 2023-10-17 DIAGNOSIS — Z23 ENCOUNTER FOR IMMUNIZATION: ICD-10-CM

## 2023-10-17 DIAGNOSIS — M08.00 UNSPECIFIED JUVENILE RHEUMATOID ARTHRITIS OF UNSPECIFIED SITE: ICD-10-CM

## 2023-10-17 PROCEDURE — 99214 OFFICE O/P EST MOD 30 MIN: CPT | Mod: 25

## 2023-10-17 PROCEDURE — G0008: CPT

## 2023-10-17 PROCEDURE — 90662 IIV NO PRSV INCREASED AG IM: CPT

## 2023-11-27 ENCOUNTER — APPOINTMENT (OUTPATIENT)
Dept: FAMILY MEDICINE | Facility: CLINIC | Age: 73
End: 2023-11-27
Payer: MEDICARE

## 2023-11-27 DIAGNOSIS — J06.9 ACUTE UPPER RESPIRATORY INFECTION, UNSPECIFIED: ICD-10-CM

## 2023-11-27 PROCEDURE — 99212 OFFICE O/P EST SF 10 MIN: CPT | Mod: 95

## 2023-11-29 ENCOUNTER — APPOINTMENT (OUTPATIENT)
Dept: FAMILY MEDICINE | Facility: CLINIC | Age: 73
End: 2023-11-29

## 2023-12-29 ENCOUNTER — RX RENEWAL (OUTPATIENT)
Age: 73
End: 2023-12-29

## 2023-12-29 RX ORDER — INSULIN DEGLUDEC INJECTION 200 U/ML
200 INJECTION, SOLUTION SUBCUTANEOUS
Qty: 36 | Refills: 1 | Status: ACTIVE | COMMUNITY
Start: 2023-12-29 | End: 1900-01-01

## 2023-12-29 RX ORDER — INSULIN ASPART 100 [IU]/ML
100 INJECTION, SOLUTION INTRAVENOUS; SUBCUTANEOUS
Qty: 105 | Refills: 1 | Status: ACTIVE | COMMUNITY
Start: 2023-12-29 | End: 1900-01-01

## 2023-12-29 RX ORDER — INSULIN ASPART 100 [IU]/ML
100 INJECTION, SOLUTION INTRAVENOUS; SUBCUTANEOUS
Qty: 8 | Refills: 1 | Status: ACTIVE | COMMUNITY
Start: 2018-09-28 | End: 1900-01-01

## 2023-12-29 RX ORDER — INSULIN DEGLUDEC INJECTION 200 U/ML
200 INJECTION, SOLUTION SUBCUTANEOUS
Qty: 36 | Refills: 1 | Status: ACTIVE | COMMUNITY
Start: 2020-02-07 | End: 1900-01-01

## 2024-01-04 ASSESSMENT — REFRACTION_AUTOREFRACTION
OS_SPHERE: +2.75
OS_CYLINDER: -2.00
OD_CYLINDER: -0.75
OD_AXIS: 060
OS_AXIS: 094
OD_SPHERE: +0.50

## 2024-01-04 ASSESSMENT — KERATOMETRY
OS_K1POWER_DIOPTERS: 43.05
OD_K1POWER_DIOPTERS: 43.72
OS_K2POWER_DIOPTERS: 43.77
OD_K2POWER_DIOPTERS: 43.10
OD_AXISANGLE_DEGREES: 150
OS_AXISANGLE_DEGREES: 009

## 2024-01-04 ASSESSMENT — SPHEQUIV_DERIVED
OD_SPHEQUIV: 0.125
OS_SPHEQUIV: 1.75

## 2024-01-04 ASSESSMENT — AXIALLENGTH_DERIVED
OS_AL: 22.96
OD_AL: 23.5763

## 2024-01-08 ENCOUNTER — APPOINTMENT (OUTPATIENT)
Dept: PULMONOLOGY | Facility: CLINIC | Age: 74
End: 2024-01-08
Payer: MEDICARE

## 2024-01-08 VITALS
BODY MASS INDEX: 45.18 KG/M2 | WEIGHT: 255 LBS | HEIGHT: 63 IN | DIASTOLIC BLOOD PRESSURE: 78 MMHG | HEART RATE: 71 BPM | OXYGEN SATURATION: 96 % | TEMPERATURE: 97.6 F | SYSTOLIC BLOOD PRESSURE: 160 MMHG

## 2024-01-08 DIAGNOSIS — B33.8 OTHER SPECIFIED VIRAL DISEASES: ICD-10-CM

## 2024-01-08 PROCEDURE — 99214 OFFICE O/P EST MOD 30 MIN: CPT

## 2024-01-08 NOTE — REASON FOR VISIT
[Follow-Up] : a follow-up visit [Cough] : cough [ILD] : ILD [TextBox_44] : Patient states she had RSV around the end of November.  She is still experiencing a cough and her voice goes in and out.

## 2024-01-08 NOTE — HISTORY OF PRESENT ILLNESS
[Never] : never [TextBox_4] : 1/8/2024 The patient is a 72yo female with ILD and morbid obesity  She had RSV for 5weeks only improving over the last 2 weeks She had a COVID test neg  The patient's dx of RSV was a presumptive one   She did have wheezing and is on albuterol  but is no longer wheezing  but her intentional tremor is worse      She has not had any success in losing weight

## 2024-01-08 NOTE — PHYSICAL EXAM
[No Acute Distress] : no acute distress [Normal Appearance] : normal appearance [Normal Rate/Rhythm] : normal rate/rhythm [Normal S1, S2] : normal s1, s2 [No Resp Distress] : no resp distress [No Abnormalities] : no abnormalities [Benign] : benign [No Clubbing] : no clubbing [No Cyanosis] : no cyanosis [No Edema] : no edema [FROM] : FROM [No Focal Deficits] : no focal deficits [Oriented x3] : oriented x3 [Normal Affect] : normal affect [TextBox_2] : morbidly obese  BMI 47 > 45 [TextBox_68] : crackles bilaterally lower post bases very los in her chest

## 2024-01-08 NOTE — ASSESSMENT
[FreeTextEntry1] : .1/18/2023 the patient is post COVID with a chronic cough. Patient states that certain positions she coughs and wheezes more than others. Patient's cough is worse in the evenings. The patient had a spirometry which showed that her FEV1 was 1.9 L 96% of predicted.. Patient does not need any bronchodilation at this time patient has irritable bronchial tubes and a steroid inhaler will be ordered to decrease the inflammation in her bronchial tubes and suppress her cough. Patient is due back in the office on the 24th for pulmonary function test to further evaluate her interstitial disease time spent 30 minutes counseling, education, documentation, imaging reviewed, medication reviewed, inhaler demonstrated, old records reviewed, HX and PE follow-up in 2 months. Patient will call if the steroid inhaler is not effective    3/20/23 The patient is clincally stable She has less cough Her PFT 1/24/23 had a DLCO of 54% and a Sp DLCO of 76% , on 9/2021 DLCO 86 spDLCO 94% 1/11/21 DLCO 63% and spDLCO 63 % although the DLCO is only 54% it is 76 % on SpDLCO is more accurate as it is adjusted for [place] Also could be lower than 9/2021 b/o anemia will check Hgb level f/u PFT and CT scan in one year time spent 40 min counseling, education, documentation, imaging reviewed, medication reviewed, old records reviewed, HX and PE    7/10/23 1) dlco on the most recnt PFT is in the same range as those preceding. The DLCOwas 60% and the spDLCO was 76 2) the patient was told about her HGB of 12.5 being less than a prior hgb in 2021 I will repeat the HGB f/u 6months time spent 30min counseling, education, documentation, imaging reviewed, medication reviewed, old records reviewed, HX and PE.  1/8/20224 1 The patient can stop the Albuterol   2) ILD seems stable  crackles are still present  f/u in 6mos with a PFT  3) I discussed weight loss with the patient and she states she is still trying time spent 30 min  counseling, education, documentation, imaging reviewed, medication reviewed, inhaler demonstrated, old records reviewed, HX and PE

## 2024-01-11 ENCOUNTER — APPOINTMENT (OUTPATIENT)
Dept: ENDOCRINOLOGY | Facility: CLINIC | Age: 74
End: 2024-01-11
Payer: MEDICARE

## 2024-01-11 VITALS
OXYGEN SATURATION: 97 % | HEIGHT: 63 IN | WEIGHT: 257 LBS | DIASTOLIC BLOOD PRESSURE: 80 MMHG | TEMPERATURE: 98 F | RESPIRATION RATE: 20 BRPM | HEART RATE: 80 BPM | BODY MASS INDEX: 45.54 KG/M2 | SYSTOLIC BLOOD PRESSURE: 160 MMHG

## 2024-01-11 LAB
GLUCOSE BLDC GLUCOMTR-MCNC: 133
HBA1C MFR BLD HPLC: 8.6
LDLC SERPL DIRECT ASSAY-MCNC: 98
MICROALBUMIN/CREAT 24H UR-RTO: 36

## 2024-01-11 PROCEDURE — G2211 COMPLEX E/M VISIT ADD ON: CPT

## 2024-01-11 PROCEDURE — 99214 OFFICE O/P EST MOD 30 MIN: CPT

## 2024-01-11 PROCEDURE — 82962 GLUCOSE BLOOD TEST: CPT

## 2024-01-11 NOTE — ASSESSMENT
[FreeTextEntry1] : 73 year old female with DM (type 2 versus element of pancreatic DM), HTN and hyperlipidemia here for follow up. her diabetes control has worsened, likely due to recent respiratory illness and dietary indiscretion.   1. Type 2 DM-   Will arrange for training on CGM.  Download CGM in 2 weeks and based on review, will further adjust insulin regimen.     CGM medical necessity statement: Patient tests her blood glucose 4 or more times a day and injects insulin 3 or more times per day. She is seen regularly at this office within 6 month intervals. Patient requires frequent adjustments to her insulin regimen on the basis of CGM results.  2. Hyperlipidemia- continue atorvastatin 3. HTN- continue lisinopril 4. Vitamin D insufficiency- Continue Vitamin D2 50,000 IU every 2 weeks.  Follow up in 4 months.

## 2024-01-11 NOTE — HISTORY OF PRESENT ILLNESS
[FreeTextEntry1] : Follow up DM, Hyperlipidemia and HTN.    Quality:  pancreatic/ type 2 DM Severity:  moderate Duration of diabetes:  since 2009 Onset:  occurred after severe pancreatitis in 2009 Associated Complications/ Symptoms:  microalbuminuria Modifying Factors:  Better with insulin  SMBG: tests blood glucose 4 times per day.  Received Dexcom G7, but has not yet completed training on this.     Current Diabetic Medication Regimen: Tresiba 85 units qAM Novolog 45  units with meals  (usually only eats 2 meals a day) Pioglitazone 15 mg daily  (reports improved BG and lower insulin requirements since starting this) Intolerant to Jardiance due to yeast infection.    Intolerant to metformin, cannot take GLP-1 due to history of pancreatitis.     Having a lot of back pain and wants to try new pain management specialist.  Had RSV in November with prolonged course.

## 2024-01-18 ENCOUNTER — APPOINTMENT (OUTPATIENT)
Dept: FAMILY MEDICINE | Facility: CLINIC | Age: 74
End: 2024-01-18
Payer: MEDICARE

## 2024-01-18 VITALS
RESPIRATION RATE: 19 BRPM | HEART RATE: 75 BPM | SYSTOLIC BLOOD PRESSURE: 130 MMHG | OXYGEN SATURATION: 95 % | DIASTOLIC BLOOD PRESSURE: 80 MMHG | TEMPERATURE: 95 F | BODY MASS INDEX: 45.25 KG/M2 | WEIGHT: 255.38 LBS | HEIGHT: 63 IN

## 2024-01-18 DIAGNOSIS — Z13.820 ENCOUNTER FOR SCREENING FOR OSTEOPOROSIS: ICD-10-CM

## 2024-01-18 PROCEDURE — 99214 OFFICE O/P EST MOD 30 MIN: CPT

## 2024-01-18 RX ORDER — ALBUTEROL SULFATE 90 UG/1
108 (90 BASE) INHALANT RESPIRATORY (INHALATION)
Qty: 1 | Refills: 1 | Status: DISCONTINUED | COMMUNITY
Start: 2023-11-27 | End: 2024-01-18

## 2024-01-18 NOTE — HISTORY OF PRESENT ILLNESS
[FreeTextEntry1] : med refills BP check [de-identified] : Patient presents for routine medication refills and blood pressure check.  She is improving since she had RSV in end of November.  Follows with pulmonology.  Denies acute concerns or complaints at this time.

## 2024-01-18 NOTE — HEALTH RISK ASSESSMENT
[No falls in past year] : Patient reported no falls in the past year [0] : 2) Feeling down, depressed, or hopeless: Not at all (0) [PHQ-2 Negative - No further assessment needed] : PHQ-2 Negative - No further assessment needed [[id number]] : 0

## 2024-01-18 NOTE — PLAN
[FreeTextEntry1] : Hypertension-/80, well-controlled with carvedilol 3.125 mg 1 tablet twice daily, lisinopril HCTZ 20-25 mg once daily, nifedipine ER 30 mg once daily.  Medications reviewed and renewed.  Hyperlipidemia-atorvastatin 80 mg once daily at bedtime.  Medications reviewed and renewed Osteoporosis screening-patient has not had a DEXA scan in the past few years.  Denies previous osteopenia or osteoporosis.  Routine DEXA scan ordered.

## 2024-01-23 ENCOUNTER — OFFICE (OUTPATIENT)
Dept: URBAN - METROPOLITAN AREA CLINIC 104 | Facility: CLINIC | Age: 74
Setting detail: OPHTHALMOLOGY
End: 2024-01-23
Payer: MEDICARE

## 2024-01-23 DIAGNOSIS — H01.004: ICD-10-CM

## 2024-01-23 DIAGNOSIS — E11.9: ICD-10-CM

## 2024-01-23 DIAGNOSIS — H18.513: ICD-10-CM

## 2024-01-23 DIAGNOSIS — Z79.4: ICD-10-CM

## 2024-01-23 DIAGNOSIS — H53.002: ICD-10-CM

## 2024-01-23 DIAGNOSIS — H04.123: ICD-10-CM

## 2024-01-23 DIAGNOSIS — H43.391: ICD-10-CM

## 2024-01-23 DIAGNOSIS — H01.002: ICD-10-CM

## 2024-01-23 DIAGNOSIS — Z96.1: ICD-10-CM

## 2024-01-23 DIAGNOSIS — H43.812: ICD-10-CM

## 2024-01-23 DIAGNOSIS — H01.001: ICD-10-CM

## 2024-01-23 DIAGNOSIS — Z94.7: ICD-10-CM

## 2024-01-23 PROCEDURE — 99213 OFFICE O/P EST LOW 20 MIN: CPT | Performed by: SPECIALIST

## 2024-01-23 ASSESSMENT — CORNEAL DYSTROPHY - POSTERIOR: OD_POSTERIORDYSTROPHY: 3+ 4+ GUTTATA

## 2024-01-23 ASSESSMENT — REFRACTION_AUTOREFRACTION
OD_AXIS: 076
OD_SPHERE: +0.50
OS_CYLINDER: -0.50
OS_SPHERE: +1.50
OS_AXIS: 126
OD_CYLINDER: -1.00

## 2024-01-23 ASSESSMENT — SPHEQUIV_DERIVED
OS_SPHEQUIV: 1.25
OD_SPHEQUIV: 0

## 2024-01-23 ASSESSMENT — TEAR BREAK UP TIME (TBUT)
OD_TBUT: 1+
OS_TBUT: 1+

## 2024-01-23 ASSESSMENT — CONFRONTATIONAL VISUAL FIELD TEST (CVF)
OD_FINDINGS: FULL
OS_FINDINGS: FULL

## 2024-01-23 ASSESSMENT — LID EXAM ASSESSMENTS
OS_BLEPHARITIS: LLL LUL 1+
OD_BLEPHARITIS: RLL RUL 1+

## 2024-03-13 ENCOUNTER — APPOINTMENT (OUTPATIENT)
Dept: FAMILY MEDICINE | Facility: CLINIC | Age: 74
End: 2024-03-13
Payer: MEDICARE

## 2024-03-13 VITALS
BODY MASS INDEX: 43.23 KG/M2 | RESPIRATION RATE: 19 BRPM | WEIGHT: 244 LBS | OXYGEN SATURATION: 94 % | HEIGHT: 63 IN | HEART RATE: 97 BPM | DIASTOLIC BLOOD PRESSURE: 80 MMHG | TEMPERATURE: 97 F | SYSTOLIC BLOOD PRESSURE: 140 MMHG

## 2024-03-13 DIAGNOSIS — U09.9 CHRONIC COUGH: ICD-10-CM

## 2024-03-13 DIAGNOSIS — R05.3 CHRONIC COUGH: ICD-10-CM

## 2024-03-13 DIAGNOSIS — R05.9 COUGH, UNSPECIFIED: ICD-10-CM

## 2024-03-13 DIAGNOSIS — R05.1 ACUTE COUGH: ICD-10-CM

## 2024-03-13 DIAGNOSIS — E66.01 MORBID (SEVERE) OBESITY DUE TO EXCESS CALORIES: ICD-10-CM

## 2024-03-13 PROCEDURE — 99214 OFFICE O/P EST MOD 30 MIN: CPT

## 2024-03-13 NOTE — HISTORY OF PRESENT ILLNESS
[FreeTextEntry1] : persistent cough for 2 weeks  follows with pulmonary history of RSV  in the fall

## 2024-03-21 ENCOUNTER — NON-APPOINTMENT (OUTPATIENT)
Age: 74
End: 2024-03-21

## 2024-03-21 LAB
INFLUENZA A RESULT: NOT DETECTED
INFLUENZA B RESULT: NOT DETECTED
RESP SYN VIRUS RESULT: NOT DETECTED
SARS-COV-2 RESULT: NOT DETECTED

## 2024-04-16 ENCOUNTER — APPOINTMENT (OUTPATIENT)
Dept: FAMILY MEDICINE | Facility: CLINIC | Age: 74
End: 2024-04-16
Payer: MEDICARE

## 2024-04-16 VITALS
DIASTOLIC BLOOD PRESSURE: 86 MMHG | WEIGHT: 242 LBS | SYSTOLIC BLOOD PRESSURE: 135 MMHG | OXYGEN SATURATION: 96 % | BODY MASS INDEX: 42.88 KG/M2 | HEIGHT: 63 IN | HEART RATE: 63 BPM | TEMPERATURE: 97.5 F

## 2024-04-16 DIAGNOSIS — M06.9 RHEUMATOID ARTHRITIS, UNSPECIFIED: ICD-10-CM

## 2024-04-16 DIAGNOSIS — B35.4 TINEA CORPORIS: ICD-10-CM

## 2024-04-16 PROCEDURE — 99214 OFFICE O/P EST MOD 30 MIN: CPT

## 2024-04-16 RX ORDER — ATORVASTATIN CALCIUM 80 MG/1
80 TABLET, FILM COATED ORAL
Qty: 90 | Refills: 0 | Status: ACTIVE | COMMUNITY
Start: 2022-03-04 | End: 1900-01-01

## 2024-04-16 RX ORDER — NYSTATIN AND TRIAMCINOLONE ACETONIDE 100000; 1 MG/G; MG/G
100000-0.1 CREAM TOPICAL
Qty: 15 | Refills: 2 | Status: ACTIVE | COMMUNITY
Start: 2024-04-16 | End: 1900-01-01

## 2024-04-16 RX ORDER — NIFEDIPINE 30 MG/1
30 TABLET, EXTENDED RELEASE ORAL DAILY
Qty: 90 | Refills: 0 | Status: ACTIVE | COMMUNITY
Start: 2022-03-04 | End: 1900-01-01

## 2024-04-16 RX ORDER — LISINOPRIL AND HYDROCHLOROTHIAZIDE TABLETS 20; 25 MG/1; MG/1
20-25 TABLET ORAL DAILY
Qty: 90 | Refills: 0 | Status: ACTIVE | COMMUNITY
Start: 2021-07-07 | End: 1900-01-01

## 2024-04-16 RX ORDER — FLUTICASONE FUROATE 200 UG/1
200 POWDER RESPIRATORY (INHALATION) DAILY
Qty: 1 | Refills: 1 | Status: ACTIVE | COMMUNITY
Start: 2024-03-13 | End: 1900-01-01

## 2024-04-16 RX ORDER — CARVEDILOL 3.12 MG/1
3.12 TABLET, FILM COATED ORAL TWICE DAILY
Qty: 180 | Refills: 0 | Status: ACTIVE | COMMUNITY
Start: 2021-07-07 | End: 1900-01-01

## 2024-04-16 NOTE — HEALTH RISK ASSESSMENT
[No] : No [No falls in past year] : Patient reported no falls in the past year [0] : 2) Feeling down, depressed, or hopeless: Not at all (0) [PHQ-2 Negative - No further assessment needed] : PHQ-2 Negative - No further assessment needed [PTK2Znquo] : 0

## 2024-04-16 NOTE — PHYSICAL EXAM
[No Acute Distress] : no acute distress [Well Nourished] : well nourished [Well Developed] : well developed [Well-Appearing] : well-appearing [Normal Sclera/Conjunctiva] : normal sclera/conjunctiva [PERRL] : pupils equal round and reactive to light [EOMI] : extraocular movements intact [Normal Outer Ear/Nose] : the outer ears and nose were normal in appearance [Normal Oropharynx] : the oropharynx was normal [No JVD] : no jugular venous distention [No Lymphadenopathy] : no lymphadenopathy [Supple] : supple [Thyroid Normal, No Nodules] : the thyroid was normal and there were no nodules present [No Respiratory Distress] : no respiratory distress  [No Accessory Muscle Use] : no accessory muscle use [Clear to Auscultation] : lungs were clear to auscultation bilaterally [Normal Rate] : normal rate  [Regular Rhythm] : with a regular rhythm [Normal S1, S2] : normal S1 and S2 [No Murmur] : no murmur heard [No Carotid Bruits] : no carotid bruits [No Abdominal Bruit] : a ~M bruit was not heard ~T in the abdomen [No Varicosities] : no varicosities [Pedal Pulses Present] : the pedal pulses are present [No Edema] : there was no peripheral edema [No Palpable Aorta] : no palpable aorta [No Extremity Clubbing/Cyanosis] : no extremity clubbing/cyanosis [Soft] : abdomen soft [Non Tender] : non-tender [Non-distended] : non-distended [No Masses] : no abdominal mass palpated [No HSM] : no HSM [Normal Bowel Sounds] : normal bowel sounds [Normal Posterior Cervical Nodes] : no posterior cervical lymphadenopathy [Normal Anterior Cervical Nodes] : no anterior cervical lymphadenopathy [No CVA Tenderness] : no CVA  tenderness [No Spinal Tenderness] : no spinal tenderness [No Joint Swelling] : no joint swelling [Coordination Grossly Intact] : coordination grossly intact [No Focal Deficits] : no focal deficits [Normal Gait] : normal gait [Normal Affect] : the affect was normal [Deep Tendon Reflexes (DTR)] : deep tendon reflexes were 2+ and symmetric [Normal Insight/Judgement] : insight and judgment were intact [de-identified] : ambulates with cane [de-identified] : Satillite spots below abdomen /fold

## 2024-04-16 NOTE — HISTORY OF PRESENT ILLNESS
[FreeTextEntry1] : Presents today for routine follow-up and refills.  Follows with Cardiology, Endo and Neurology.  Has labs done by Endo.  Offers no acute events.

## 2024-04-16 NOTE — ASSESSMENT
[FreeTextEntry1] : Presents today for refills.  She follows with Cardiology, Endo and Neurology.  Last labs done by Endo.  Results renewed with patient.  Vitamin D def HTN HLD Tinea  Rheumatoid Arthritis

## 2024-06-03 ENCOUNTER — NON-APPOINTMENT (OUTPATIENT)
Age: 74
End: 2024-06-03

## 2024-06-04 LAB
HBA1C MFR BLD HPLC: 9.6
LDLC SERPL DIRECT ASSAY-MCNC: 79
MICROALBUMIN/CREAT 24H UR-RTO: 66

## 2024-06-06 ENCOUNTER — APPOINTMENT (OUTPATIENT)
Dept: ENDOCRINOLOGY | Facility: CLINIC | Age: 74
End: 2024-06-06
Payer: MEDICARE

## 2024-06-06 VITALS
RESPIRATION RATE: 20 BRPM | HEART RATE: 80 BPM | BODY MASS INDEX: 42.52 KG/M2 | SYSTOLIC BLOOD PRESSURE: 110 MMHG | DIASTOLIC BLOOD PRESSURE: 70 MMHG | WEIGHT: 240 LBS | OXYGEN SATURATION: 97 % | HEIGHT: 63 IN | TEMPERATURE: 98 F

## 2024-06-06 DIAGNOSIS — E11.65 TYPE 2 DIABETES MELLITUS WITH HYPERGLYCEMIA: ICD-10-CM

## 2024-06-06 DIAGNOSIS — E55.9 VITAMIN D DEFICIENCY, UNSPECIFIED: ICD-10-CM

## 2024-06-06 DIAGNOSIS — Z79.4 TYPE 2 DIABETES MELLITUS WITH HYPERGLYCEMIA: ICD-10-CM

## 2024-06-06 DIAGNOSIS — I10 ESSENTIAL (PRIMARY) HYPERTENSION: ICD-10-CM

## 2024-06-06 DIAGNOSIS — E78.5 HYPERLIPIDEMIA, UNSPECIFIED: ICD-10-CM

## 2024-06-06 PROCEDURE — 99214 OFFICE O/P EST MOD 30 MIN: CPT

## 2024-06-06 PROCEDURE — G2211 COMPLEX E/M VISIT ADD ON: CPT

## 2024-06-06 RX ORDER — PIOGLITAZONE HYDROCHLORIDE 15 MG/1
15 TABLET ORAL DAILY
Qty: 90 | Refills: 3 | Status: DISCONTINUED | COMMUNITY
Start: 2022-07-26 | End: 2024-06-06

## 2024-06-06 RX ORDER — INSULIN DEGLUDEC INJECTION 100 U/ML
100 INJECTION, SOLUTION SUBCUTANEOUS
Refills: 0 | Status: DISCONTINUED | COMMUNITY
End: 2024-06-06

## 2024-06-06 NOTE — HISTORY OF PRESENT ILLNESS
[FreeTextEntry1] : Follow up DM, Hyperlipidemia and HTN. Seeing neurology for spinal stenosis and tremors.      Quality:  pancreatic/ type 2 DM Severity:  moderate Duration of diabetes:  since 2009 Onset:  occurred after severe pancreatitis in 2009 Associated Complications/ Symptoms:  microalbuminuria Modifying Factors:  Better with insulin  SMBG: tests blood glucose 4 times per day.  Has Dexcom G7, but not using in several months due to frequent imaging studies.   Prior download showed frequent postprandial hyperglycemia.      Current Diabetic Medication Regimen: Tresiba 85 units qAM Novolog 30- 45  units with meals  (usually only eats 2 meals a day) Intolerant to Pioglitazone due to edema.    Intolerant to Jardiance due to yeast infection.    Intolerant to metformin, cannot take GLP-1 due to history of pancreatitis.

## 2024-06-06 NOTE — ASSESSMENT
[FreeTextEntry1] : 74 year old female with DM (type 2 versus element of pancreatic DM), HTN and hyperlipidemia here for follow up. Her diabetes control has worsened.  1. Type 2 DM-    I have advised her to resume CGM.  Increase Novolog by 5- 10 units and keep adjusting until BG is under 180 mg/dl after meals.   Check C-peptide with next labs, and if low, consider using insulin pump therapy in the future.  She has been intolerant to most oral medications.    We discussed the risks of uncontrolled DM.     CGM medical necessity statement: Patient tests her blood glucose 4 or more times a day and injects insulin 3 or more times per day. She is seen regularly at this office within 6 month intervals. Patient requires frequent adjustments to her insulin regimen on the basis of CGM results.  2. Hyperlipidemia- continue atorvastatin 3. HTN- continue lisinopril 4. Vitamin D insufficiency- Continue Vitamin D2 50,000 IU every 2 weeks.  Follow up in 4 months.

## 2024-06-06 NOTE — PHYSICAL EXAM
[Obese] : obese [No Acute Distress] : no acute distress [Normal Sclera/Conjunctiva] : normal sclera/conjunctiva [No Lid Lag] : no lid lag [No Neck Mass] : no neck mass was observed [No LAD] : no lymphadenopathy [Supple] : the neck was supple [Thyroid Not Enlarged] : the thyroid was not enlarged [No Thyroid Nodules] : no palpable thyroid nodules [No Respiratory Distress] : no respiratory distress [Clear to Auscultation] : lungs were clear to auscultation bilaterally [Normal S1, S2] : normal S1 and S2 [No Murmurs] : no murmurs [Normal Rate] : heart rate was normal [Regular Rhythm] : with a regular rhythm [Acanthosis Nigricans] : no acanthosis nigricans [Normal Affect] : the affect was normal [Normal Insight/Judgement] : insight and judgment were intact [Normal Mood] : the mood was normal

## 2024-06-06 NOTE — REVIEW OF SYSTEMS
[Recent Weight Loss (___ Lbs)] : recent weight loss: [unfilled] lbs [Shortness Of Breath] : shortness of breath [Tremors] : tremors

## 2024-07-09 ENCOUNTER — APPOINTMENT (OUTPATIENT)
Dept: PULMONOLOGY | Facility: CLINIC | Age: 74
End: 2024-07-09
Payer: MEDICARE

## 2024-07-09 VITALS
HEART RATE: 60 BPM | HEIGHT: 63 IN | WEIGHT: 245 LBS | SYSTOLIC BLOOD PRESSURE: 122 MMHG | TEMPERATURE: 97.3 F | BODY MASS INDEX: 43.41 KG/M2 | OXYGEN SATURATION: 93 % | DIASTOLIC BLOOD PRESSURE: 68 MMHG

## 2024-07-09 DIAGNOSIS — J70.3: ICD-10-CM

## 2024-07-09 PROCEDURE — 94727 GAS DIL/WSHOT DETER LNG VOL: CPT

## 2024-07-09 PROCEDURE — 94729 DIFFUSING CAPACITY: CPT

## 2024-07-09 PROCEDURE — 99214 OFFICE O/P EST MOD 30 MIN: CPT | Mod: 25

## 2024-07-09 PROCEDURE — 94010 BREATHING CAPACITY TEST: CPT

## 2024-07-16 ENCOUNTER — APPOINTMENT (OUTPATIENT)
Dept: FAMILY MEDICINE | Facility: CLINIC | Age: 74
End: 2024-07-16
Payer: MEDICARE

## 2024-07-16 VITALS
BODY MASS INDEX: 43.4 KG/M2 | RESPIRATION RATE: 64 BRPM | OXYGEN SATURATION: 97 % | TEMPERATURE: 97.8 F | SYSTOLIC BLOOD PRESSURE: 116 MMHG | WEIGHT: 245 LBS | DIASTOLIC BLOOD PRESSURE: 74 MMHG | HEART RATE: 64 BPM

## 2024-07-16 DIAGNOSIS — Z79.4 TYPE 2 DIABETES MELLITUS WITH HYPERGLYCEMIA: ICD-10-CM

## 2024-07-16 DIAGNOSIS — E55.9 VITAMIN D DEFICIENCY, UNSPECIFIED: ICD-10-CM

## 2024-07-16 DIAGNOSIS — E66.01 MORBID (SEVERE) OBESITY DUE TO EXCESS CALORIES: ICD-10-CM

## 2024-07-16 DIAGNOSIS — M06.9 RHEUMATOID ARTHRITIS, UNSPECIFIED: ICD-10-CM

## 2024-07-16 DIAGNOSIS — R06.00 DYSPNEA, UNSPECIFIED: ICD-10-CM

## 2024-07-16 DIAGNOSIS — E11.65 TYPE 2 DIABETES MELLITUS WITH HYPERGLYCEMIA: ICD-10-CM

## 2024-07-16 DIAGNOSIS — E78.5 HYPERLIPIDEMIA, UNSPECIFIED: ICD-10-CM

## 2024-07-16 PROCEDURE — 99214 OFFICE O/P EST MOD 30 MIN: CPT

## 2024-07-25 ENCOUNTER — OFFICE (OUTPATIENT)
Dept: URBAN - METROPOLITAN AREA CLINIC 104 | Facility: CLINIC | Age: 74
Setting detail: OPHTHALMOLOGY
End: 2024-07-25
Payer: MEDICARE

## 2024-07-25 DIAGNOSIS — H43.812: ICD-10-CM

## 2024-07-25 DIAGNOSIS — H18.513: ICD-10-CM

## 2024-07-25 DIAGNOSIS — H04.123: ICD-10-CM

## 2024-07-25 DIAGNOSIS — Z94.7: ICD-10-CM

## 2024-07-25 PROCEDURE — 92014 COMPRE OPH EXAM EST PT 1/>: CPT | Performed by: SPECIALIST

## 2024-07-25 ASSESSMENT — CONFRONTATIONAL VISUAL FIELD TEST (CVF)
OD_FINDINGS: FULL
OS_FINDINGS: FULL

## 2024-07-25 ASSESSMENT — LID EXAM ASSESSMENTS
OS_BLEPHARITIS: LLL LUL 1+
OD_BLEPHARITIS: RLL RUL 1+

## 2024-09-09 ENCOUNTER — RX RENEWAL (OUTPATIENT)
Age: 74
End: 2024-09-09

## 2024-10-08 ENCOUNTER — APPOINTMENT (OUTPATIENT)
Dept: FAMILY MEDICINE | Facility: CLINIC | Age: 74
End: 2024-10-08
Payer: MEDICARE

## 2024-10-08 VITALS
DIASTOLIC BLOOD PRESSURE: 74 MMHG | SYSTOLIC BLOOD PRESSURE: 118 MMHG | TEMPERATURE: 97.9 F | OXYGEN SATURATION: 96 % | BODY MASS INDEX: 44.56 KG/M2 | WEIGHT: 242.13 LBS | HEART RATE: 91 BPM | HEIGHT: 62 IN

## 2024-10-08 DIAGNOSIS — M06.9 RHEUMATOID ARTHRITIS, UNSPECIFIED: ICD-10-CM

## 2024-10-08 DIAGNOSIS — R60.0 LOCALIZED EDEMA: ICD-10-CM

## 2024-10-08 DIAGNOSIS — I10 ESSENTIAL (PRIMARY) HYPERTENSION: ICD-10-CM

## 2024-10-08 DIAGNOSIS — Z79.4 TYPE 2 DIABETES MELLITUS WITH HYPERGLYCEMIA: ICD-10-CM

## 2024-10-08 DIAGNOSIS — E11.65 TYPE 2 DIABETES MELLITUS WITH HYPERGLYCEMIA: ICD-10-CM

## 2024-10-08 DIAGNOSIS — E78.5 HYPERLIPIDEMIA, UNSPECIFIED: ICD-10-CM

## 2024-10-08 DIAGNOSIS — E55.9 VITAMIN D DEFICIENCY, UNSPECIFIED: ICD-10-CM

## 2024-10-08 DIAGNOSIS — J70.3: ICD-10-CM

## 2024-10-08 DIAGNOSIS — I89.0 LYMPHEDEMA, NOT ELSEWHERE CLASSIFIED: ICD-10-CM

## 2024-10-08 DIAGNOSIS — R60.9 LYMPHEDEMA, NOT ELSEWHERE CLASSIFIED: ICD-10-CM

## 2024-10-08 DIAGNOSIS — E66.01 MORBID (SEVERE) OBESITY DUE TO EXCESS CALORIES: ICD-10-CM

## 2024-10-08 PROCEDURE — 99214 OFFICE O/P EST MOD 30 MIN: CPT

## 2024-12-05 ENCOUNTER — OFFICE (OUTPATIENT)
Dept: URBAN - METROPOLITAN AREA CLINIC 104 | Facility: CLINIC | Age: 74
Setting detail: OPHTHALMOLOGY
End: 2024-12-05
Payer: MEDICARE

## 2024-12-05 DIAGNOSIS — H43.812: ICD-10-CM

## 2024-12-05 DIAGNOSIS — H01.002: ICD-10-CM

## 2024-12-05 DIAGNOSIS — Z94.7: ICD-10-CM

## 2024-12-05 DIAGNOSIS — H01.001: ICD-10-CM

## 2024-12-05 DIAGNOSIS — E11.9: ICD-10-CM

## 2024-12-05 DIAGNOSIS — H18.513: ICD-10-CM

## 2024-12-05 DIAGNOSIS — H43.391: ICD-10-CM

## 2024-12-05 DIAGNOSIS — H01.004: ICD-10-CM

## 2024-12-05 DIAGNOSIS — H01.005: ICD-10-CM

## 2024-12-05 DIAGNOSIS — H04.123: ICD-10-CM

## 2024-12-05 DIAGNOSIS — Z96.1: ICD-10-CM

## 2024-12-05 DIAGNOSIS — Z79.4: ICD-10-CM

## 2024-12-05 PROCEDURE — 99213 OFFICE O/P EST LOW 20 MIN: CPT | Performed by: SPECIALIST

## 2024-12-05 ASSESSMENT — KERATOMETRY
OS_K2POWER_DIOPTERS: 43.60
OD_K2POWER_DIOPTERS: 43.27
OD_K1POWER_DIOPTERS: 42.99
OD_AXISANGLE_DEGREES: 126
OS_AXISANGLE_DEGREES: 6
OS_K1POWER_DIOPTERS: 42.67

## 2024-12-05 ASSESSMENT — CONFRONTATIONAL VISUAL FIELD TEST (CVF)
OD_FINDINGS: FULL
OS_FINDINGS: FULL

## 2024-12-05 ASSESSMENT — REFRACTION_AUTOREFRACTION
OD_CYLINDER: -0.50
OS_CYLINDER: -2.00
OS_SPHERE: +3.50
OD_AXIS: 56
OD_SPHERE: +0.50
OS_AXIS: 91

## 2024-12-05 ASSESSMENT — TEAR BREAK UP TIME (TBUT)
OD_TBUT: 1+
OS_TBUT: 1+

## 2024-12-05 ASSESSMENT — REFRACTION_CURRENTRX
OS_SPHERE: +2.75
OD_SPHERE: +2.75
OD_OVR_VA: 20/
OS_OVR_VA: 20/

## 2024-12-05 ASSESSMENT — LID EXAM ASSESSMENTS
OS_BLEPHARITIS: LLL LUL 1+
OD_BLEPHARITIS: RLL RUL 1+

## 2024-12-05 ASSESSMENT — CORNEAL DYSTROPHY - POSTERIOR: OD_POSTERIORDYSTROPHY: 3+ 4+ GUTTATA

## 2024-12-05 ASSESSMENT — TONOMETRY
OS_IOP_MMHG: 17
OD_IOP_MMHG: 17

## 2024-12-05 ASSESSMENT — PACHYMETRY
OS_CT_CORRECTION: -7
OS_CT_UM: 703

## 2024-12-05 ASSESSMENT — VISUAL ACUITY
OS_BCVA: 20/30
OD_BCVA: 20/40-2

## 2025-01-07 ENCOUNTER — APPOINTMENT (OUTPATIENT)
Dept: PULMONOLOGY | Facility: CLINIC | Age: 75
End: 2025-01-07
Payer: MEDICARE

## 2025-01-07 VITALS
HEART RATE: 80 BPM | SYSTOLIC BLOOD PRESSURE: 132 MMHG | WEIGHT: 242 LBS | BODY MASS INDEX: 44.53 KG/M2 | TEMPERATURE: 97.2 F | OXYGEN SATURATION: 96 % | HEIGHT: 62 IN | DIASTOLIC BLOOD PRESSURE: 84 MMHG

## 2025-01-07 DIAGNOSIS — J84.10 PULMONARY FIBROSIS, UNSPECIFIED: ICD-10-CM

## 2025-01-07 PROBLEM — J84.9 INTERSTITIAL LUNG DISEASE: Status: ACTIVE | Noted: 2025-01-07

## 2025-01-07 PROCEDURE — 94727 GAS DIL/WSHOT DETER LNG VOL: CPT

## 2025-01-07 PROCEDURE — 99214 OFFICE O/P EST MOD 30 MIN: CPT | Mod: 25

## 2025-01-07 PROCEDURE — ZZZZZ: CPT

## 2025-01-07 PROCEDURE — 94010 BREATHING CAPACITY TEST: CPT

## 2025-01-07 PROCEDURE — 94729 DIFFUSING CAPACITY: CPT

## 2025-01-14 ENCOUNTER — APPOINTMENT (OUTPATIENT)
Dept: FAMILY MEDICINE | Facility: CLINIC | Age: 75
End: 2025-01-14
Payer: MEDICARE

## 2025-01-14 VITALS
DIASTOLIC BLOOD PRESSURE: 80 MMHG | HEIGHT: 62 IN | RESPIRATION RATE: 16 BRPM | BODY MASS INDEX: 43.98 KG/M2 | TEMPERATURE: 97.9 F | SYSTOLIC BLOOD PRESSURE: 128 MMHG | HEART RATE: 82 BPM | WEIGHT: 239 LBS | OXYGEN SATURATION: 97 %

## 2025-01-14 DIAGNOSIS — E11.65 TYPE 2 DIABETES MELLITUS WITH HYPERGLYCEMIA: ICD-10-CM

## 2025-01-14 DIAGNOSIS — I10 ESSENTIAL (PRIMARY) HYPERTENSION: ICD-10-CM

## 2025-01-14 DIAGNOSIS — E66.01 MORBID (SEVERE) OBESITY DUE TO EXCESS CALORIES: ICD-10-CM

## 2025-01-14 DIAGNOSIS — Z79.4 TYPE 2 DIABETES MELLITUS WITH HYPERGLYCEMIA: ICD-10-CM

## 2025-01-14 DIAGNOSIS — Z13.820 ENCOUNTER FOR SCREENING FOR OSTEOPOROSIS: ICD-10-CM

## 2025-01-14 DIAGNOSIS — M08.00 UNSPECIFIED JUVENILE RHEUMATOID ARTHRITIS OF UNSPECIFIED SITE: ICD-10-CM

## 2025-01-14 DIAGNOSIS — M06.9 RHEUMATOID ARTHRITIS, UNSPECIFIED: ICD-10-CM

## 2025-01-14 DIAGNOSIS — J84.9 INTERSTITIAL PULMONARY DISEASE, UNSPECIFIED: ICD-10-CM

## 2025-01-14 DIAGNOSIS — E78.5 HYPERLIPIDEMIA, UNSPECIFIED: ICD-10-CM

## 2025-01-14 DIAGNOSIS — E55.9 VITAMIN D DEFICIENCY, UNSPECIFIED: ICD-10-CM

## 2025-01-14 PROCEDURE — 99214 OFFICE O/P EST MOD 30 MIN: CPT

## 2025-01-26 NOTE — PLAN
[FreeTextEntry1] : HBA1C in office is 5.9, may be false value due to hx of ESRD, however mildly worsened as she did not take medications for two weeks.  Reported BS and in office BS are better controlled. Patient has lost weight on Mounjaro.  PLAN: - Decrease Lantus 18 units at bedtime - Discontinue Humalog - Increase Mounjaro 10 mg weekly- Losing weight appropriately - Continue Lipitor 40 mg daily - Received CGM to third party supplier  - Uptodate with opthalmologist and podiatry - Medication compliance re-emphasized. Labs before the next visit  BP is high in office. Counselled to follow up with PCP asap to manage the hypertension. Increase amlodipine to 10 mg daily  RTC in June 2025      
[FreeTextEntry1] : HBA1C in office is 5.9, may be false value due to hx of ESRD, however mildly worsened as she did not take medications for two weeks.  Reported BS and in office BS are better controlled. Patient has lost weight on Mounjaro.  PLAN: - Decrease Lantus 18 units at bedtime - Discontinue Humalog - Increase Mounjaro 10 mg weekly- Losing weight appropriately - Continue Lipitor 40 mg daily - Received CGM to third party supplier  - Uptodate with opthalmologist and podiatry - Medication compliance re-emphasized. Labs before the next visit  BP is high in office. Counselled to follow up with PCP asap to manage the hypertension. Increase amlodipine to 10 mg daily  RTC in June 2025      
[FreeTextEntry1] : Continue with Endo Cardio  Pulmonary\par Provide copy of lab work next visit\par Decline fit test at present

## 2025-02-05 LAB
HBA1C MFR BLD HPLC: 8.9
LDLC SERPL DIRECT ASSAY-MCNC: 90
MICROALBUMIN/CREAT 24H UR-RTO: 22

## 2025-02-06 ENCOUNTER — APPOINTMENT (OUTPATIENT)
Dept: ENDOCRINOLOGY | Facility: CLINIC | Age: 75
End: 2025-02-06
Payer: MEDICARE

## 2025-02-06 VITALS
HEART RATE: 89 BPM | WEIGHT: 231 LBS | SYSTOLIC BLOOD PRESSURE: 124 MMHG | RESPIRATION RATE: 16 BRPM | OXYGEN SATURATION: 94 % | DIASTOLIC BLOOD PRESSURE: 66 MMHG | BODY MASS INDEX: 42.51 KG/M2 | HEIGHT: 62 IN

## 2025-02-06 DIAGNOSIS — Z79.4 TYPE 2 DIABETES MELLITUS WITH HYPERGLYCEMIA: ICD-10-CM

## 2025-02-06 DIAGNOSIS — E55.9 VITAMIN D DEFICIENCY, UNSPECIFIED: ICD-10-CM

## 2025-02-06 DIAGNOSIS — E11.65 TYPE 2 DIABETES MELLITUS WITH HYPERGLYCEMIA: ICD-10-CM

## 2025-02-06 DIAGNOSIS — I10 ESSENTIAL (PRIMARY) HYPERTENSION: ICD-10-CM

## 2025-02-06 DIAGNOSIS — E78.5 HYPERLIPIDEMIA, UNSPECIFIED: ICD-10-CM

## 2025-02-06 PROCEDURE — G2211 COMPLEX E/M VISIT ADD ON: CPT

## 2025-02-06 PROCEDURE — 99214 OFFICE O/P EST MOD 30 MIN: CPT

## 2025-02-18 ENCOUNTER — RX RENEWAL (OUTPATIENT)
Age: 75
End: 2025-02-18

## 2025-02-24 ENCOUNTER — RX RENEWAL (OUTPATIENT)
Age: 75
End: 2025-02-24

## 2025-03-21 ENCOUNTER — RX RENEWAL (OUTPATIENT)
Age: 75
End: 2025-03-21

## 2025-04-15 ENCOUNTER — TRANSCRIPTION ENCOUNTER (OUTPATIENT)
Age: 75
End: 2025-04-15

## 2025-04-15 ENCOUNTER — APPOINTMENT (OUTPATIENT)
Dept: FAMILY MEDICINE | Facility: CLINIC | Age: 75
End: 2025-04-15
Payer: MEDICARE

## 2025-04-15 VITALS
BODY MASS INDEX: 42.33 KG/M2 | WEIGHT: 230 LBS | HEIGHT: 62 IN | HEART RATE: 65 BPM | RESPIRATION RATE: 16 BRPM | TEMPERATURE: 97.6 F | DIASTOLIC BLOOD PRESSURE: 80 MMHG | OXYGEN SATURATION: 97 % | SYSTOLIC BLOOD PRESSURE: 112 MMHG

## 2025-04-15 DIAGNOSIS — I89.0 LYMPHEDEMA, NOT ELSEWHERE CLASSIFIED: ICD-10-CM

## 2025-04-15 DIAGNOSIS — I10 ESSENTIAL (PRIMARY) HYPERTENSION: ICD-10-CM

## 2025-04-15 DIAGNOSIS — E55.9 VITAMIN D DEFICIENCY, UNSPECIFIED: ICD-10-CM

## 2025-04-15 DIAGNOSIS — E78.5 HYPERLIPIDEMIA, UNSPECIFIED: ICD-10-CM

## 2025-04-15 DIAGNOSIS — B37.9 CANDIDIASIS, UNSPECIFIED: ICD-10-CM

## 2025-04-15 DIAGNOSIS — M06.9 RHEUMATOID ARTHRITIS, UNSPECIFIED: ICD-10-CM

## 2025-04-15 DIAGNOSIS — E11.65 TYPE 2 DIABETES MELLITUS WITH HYPERGLYCEMIA: ICD-10-CM

## 2025-04-15 DIAGNOSIS — G20.A2 PARKINSON'S DISEASE WITHOUT DYSKINESIA, WITH FLUCTUATIONS: ICD-10-CM

## 2025-04-15 DIAGNOSIS — R60.9 LYMPHEDEMA, NOT ELSEWHERE CLASSIFIED: ICD-10-CM

## 2025-04-15 DIAGNOSIS — Z79.4 TYPE 2 DIABETES MELLITUS WITH HYPERGLYCEMIA: ICD-10-CM

## 2025-04-15 DIAGNOSIS — E66.01 MORBID (SEVERE) OBESITY DUE TO EXCESS CALORIES: ICD-10-CM

## 2025-04-15 PROCEDURE — 99214 OFFICE O/P EST MOD 30 MIN: CPT

## 2025-04-17 PROBLEM — M54.50 LOW BACK PAIN, UNSPECIFIED BACK PAIN LATERALITY, UNSPECIFIED CHRONICITY, UNSPECIFIED WHETHER SCIATICA PRESENT: Status: ACTIVE | Noted: 2021-05-05

## 2025-05-06 ENCOUNTER — RX RENEWAL (OUTPATIENT)
Age: 75
End: 2025-05-06

## 2025-05-07 ENCOUNTER — RX RENEWAL (OUTPATIENT)
Age: 75
End: 2025-05-07

## 2025-07-15 ENCOUNTER — APPOINTMENT (OUTPATIENT)
Dept: FAMILY MEDICINE | Facility: CLINIC | Age: 75
End: 2025-07-15
Payer: MEDICARE

## 2025-07-15 VITALS
HEART RATE: 84 BPM | WEIGHT: 230 LBS | RESPIRATION RATE: 16 BRPM | HEIGHT: 62 IN | DIASTOLIC BLOOD PRESSURE: 78 MMHG | BODY MASS INDEX: 42.33 KG/M2 | TEMPERATURE: 97.9 F | OXYGEN SATURATION: 98 % | SYSTOLIC BLOOD PRESSURE: 132 MMHG

## 2025-07-15 PROCEDURE — 99214 OFFICE O/P EST MOD 30 MIN: CPT

## 2025-07-16 LAB
HBA1C MFR BLD HPLC: 9
LDLC SERPL DIRECT ASSAY-MCNC: 98
MICROALBUMIN/CREAT 24H UR-RTO: 61
TSH SERPL-ACNC: 2.04

## 2025-07-17 ENCOUNTER — APPOINTMENT (OUTPATIENT)
Dept: ENDOCRINOLOGY | Facility: CLINIC | Age: 75
End: 2025-07-17
Payer: MEDICARE

## 2025-07-17 ENCOUNTER — APPOINTMENT (OUTPATIENT)
Dept: ENDOCRINOLOGY | Facility: CLINIC | Age: 75
End: 2025-07-17

## 2025-07-17 PROCEDURE — G2211 COMPLEX E/M VISIT ADD ON: CPT | Mod: 2W

## 2025-07-17 PROCEDURE — 99214 OFFICE O/P EST MOD 30 MIN: CPT | Mod: 2W

## 2025-07-17 RX ORDER — CARBIDOPA AND LEVODOPA 25; 100 MG/1; MG/1
25-100 TABLET ORAL
Qty: 540 | Refills: 0 | Status: ACTIVE | COMMUNITY
Start: 2025-06-19

## 2025-07-18 RX ORDER — DAPAGLIFLOZIN 10 MG/1
10 TABLET, FILM COATED ORAL DAILY
Qty: 90 | Refills: 1 | Status: ACTIVE | COMMUNITY
Start: 2025-07-17 | End: 1900-01-01